# Patient Record
Sex: MALE | Race: WHITE | NOT HISPANIC OR LATINO | Employment: OTHER | URBAN - METROPOLITAN AREA
[De-identification: names, ages, dates, MRNs, and addresses within clinical notes are randomized per-mention and may not be internally consistent; named-entity substitution may affect disease eponyms.]

---

## 2017-01-31 ENCOUNTER — ALLSCRIPTS OFFICE VISIT (OUTPATIENT)
Dept: OTHER | Facility: OTHER | Age: 72
End: 2017-01-31

## 2017-04-04 ENCOUNTER — ALLSCRIPTS OFFICE VISIT (OUTPATIENT)
Dept: OTHER | Facility: OTHER | Age: 72
End: 2017-04-04

## 2017-06-28 ENCOUNTER — ALLSCRIPTS OFFICE VISIT (OUTPATIENT)
Dept: OTHER | Facility: OTHER | Age: 72
End: 2017-06-28

## 2017-09-06 ENCOUNTER — ALLSCRIPTS OFFICE VISIT (OUTPATIENT)
Dept: OTHER | Facility: OTHER | Age: 72
End: 2017-09-06

## 2017-11-15 ENCOUNTER — ALLSCRIPTS OFFICE VISIT (OUTPATIENT)
Dept: OTHER | Facility: OTHER | Age: 72
End: 2017-11-15

## 2017-11-16 NOTE — PROGRESS NOTES
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)   4  Onychomycosis (110 1) (B35 1)    Plan     · *VB - Foot Exam; Status:Complete;   Done: 93YGF1249 03:23PM   · Follow-up visit in 9 weeks Evaluation and Treatment  Follow-up  Status: Hold For -Scheduling  Requested for: 75VVZ0119   · Diabetes Foot Exam Performed; Status:Complete;   Done: 90BXY1624 03:24PM   · Inspect your feet daily ; Status:Complete;   Done: 29ULF0088 03:24PM   · It is important to take good care of your feet if you have diabetes ; Status:Complete;  Done: 58OKA2997 03:24PM   · It is important to take good care of your feet ; Status:Complete;   Done: 99TAS968013:29SU    Discussion/Summary  The patient was counseled regarding instructions for management,-- patient and family education,-- importance of compliance with treatment  Patient is able to Self-Care  The treatment plan was reviewed with the patient/guardian  The patient/guardian understands and agrees with the treatment plan      Chief Complaint  nail care      History of Present Illness  HPI: This patient a morbidly obese 49-year-old white male with diabetes mellitus, complains of pain within his feet  He has pain in the heels with walking  He has pain in in his toes with shoe wear  He has no history of trauma  He has not done any treatment area patient also is pain in his left arch  He is having a gouty attack      Active Problems  1  Acute gouty arthritis (274 01) (M10 9)   2  Atherosclerosis of arteries of extremities (440 20) (I70 209)   3  Callus (700) (L84)   4  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)   5  Diabetes mellitus with neuropathy (250 60,357 2) (E11 40)   6  Diabetic neuropathy (250 60,357 2) (E11 40)   7  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)   8  Onychomycosis (110 1) (B35 1)   9  Pes planus, unspecified laterality (734) (M21 40)   10   Tenderness in limb (729 5) (M79 609)    Social History     · Former smoker (V15 82) (D48 091)   · Never Drank Alcohol    Current Meds   1  Colcrys 0 6 MG Oral Tablet; one tb po tid; Therapy: 40WWJ1733 to (Evaluate:08Nnx1564)  Requested for: 66VZL4407; Last Rx:69Gyk0599 Ordered   2  Colcrys 0 6 MG Oral Tablet; one tb po tid; Therapy: 20BFH6806 to 01 39 27 97 60)  Requested for: 50ACT0487; Last Rx:27Dwo0718 Ordered   3  GlipiZIDE 10 MG Oral Tablet; Therapy: (Kirke Sink) to Recorded   4  Januvia 50 MG Oral Tablet; Therapy: 76BNZ0616 to Recorded   5  Levothyroxine Sodium 75 MCG Oral Tablet; Therapy: 21OEB5503 to Recorded   6  Lisinopril 20 MG Oral Tablet; Therapy: (Kirke Sink) to Recorded   7  MetFORMIN HCl - 500 MG Oral Tablet; Therapy: (Kirke Sink) to Recorded   8  Multi-Vitamin TABS; Therapy: (Kirke Sink) to Recorded   9  Pradaxa 150 MG Oral Capsule; Therapy: (450 39 173) to Recorded   10  VESIcare 10 MG Oral Tablet; Therapy: (Recorded:22Qij0817) to Recorded    The medication list was reviewed and updated today  Allergies    1  No Known Drug Allergies    Physical Exam  Left Foot: Appearance: Normal except as noted: excessive pronation-- and-- pes planus  Right Foot: Appearance: Normal except as noted: excessive pronation-- and-- pes planus  Left Ankle: ROM: limited ROM in all planes Motor: diffuse weakness  Right Ankle: ROM: limited ROM in all planes Motor: diffuse weakness  Neurological Exam: performed  Light touch was absent bilaterally  Vibratory sensation was absent bilaterally  Deep tendon reflexes: achilles reflex One/4 bilaterally  Vascular Exam: performed Dorsalis pedis pulses were One/4 bilaterally  Posterior tibial pulses were One/4 bilaterally  Elevation Pallor: present bilaterally  Dependence rubor was absent bilaterally  Capillary refill time was Q  9, findings bilateral  Negative digital hair noted, but-- between 1-3 seconds bilaterally  Edema: mild bilaterally-- and-- 2/7 pitting edema   Negative Homans sign, bilateral    Toenails: All of the toenails were elongated,-- hypertrophied,-- discolored,-- shown to have subungual debris,-- tender-- and-- Mycotic with onychauxis  Socks and shoes removed, Right Foot Findings: swollen and erythematous, but no dryness  Diminished tactile sensation with monofilament testing throughout the right foot  Socks and shoes removed, Left Foot Findings: swollen, erythematous and dry  Diminished tactile sensation with monofilament testing throughout the left foot  Capillary refills findings on the right were delayed in the toes  Pulses:  1+ in the posterior tibialis on the right  1+ in the dorsalis pedis on the right  Capillary refills findings on the left were delayed in the toes  Pulses:  1+ in the posterior tibialis on the left  1+ in the dorsalis pedis on the left  Hyperkeratosis: present on both first toes-- and-- 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  There is eczema in his heels  There are fissures  There is no cellulitis  Shoe Gear Evaluation: performed ()  Procedure     Procedure: toenail debridement performed on all toenails   Indications for the procedure include professional performance of nail care required due to loss of sensation from diabetes  The procedure's risks were discussed with the patient  Procedure Note: The toenails were debrided using heavy-duty nail nippers-- and-- with a nail   Post-Procedure: the patient tolerated the procedure well  Complications: there were no complications  Bilateral plantar eczema was debrided down to normal levels  All mycotic nails debrided   Patient tolerated this well without complication her pain      Signatures   Electronically signed by : Jean Rose DPM; Nov 15 2017  3:24PM EST                       (Author)

## 2017-12-15 ENCOUNTER — GENERIC CONVERSION - ENCOUNTER (OUTPATIENT)
Dept: OTHER | Facility: OTHER | Age: 72
End: 2017-12-15

## 2017-12-18 ENCOUNTER — LAB CONVERSION - ENCOUNTER (OUTPATIENT)
Dept: OTHER | Facility: OTHER | Age: 72
End: 2017-12-18

## 2018-01-12 ENCOUNTER — ALLSCRIPTS OFFICE VISIT (OUTPATIENT)
Dept: OTHER | Facility: OTHER | Age: 73
End: 2018-01-12

## 2018-01-12 VITALS
WEIGHT: 288 LBS | HEART RATE: 96 BPM | DIASTOLIC BLOOD PRESSURE: 82 MMHG | BODY MASS INDEX: 41.23 KG/M2 | RESPIRATION RATE: 17 BRPM | SYSTOLIC BLOOD PRESSURE: 105 MMHG | HEIGHT: 70 IN

## 2018-01-12 VITALS
HEIGHT: 70 IN | WEIGHT: 288 LBS | HEART RATE: 88 BPM | RESPIRATION RATE: 18 BRPM | SYSTOLIC BLOOD PRESSURE: 130 MMHG | BODY MASS INDEX: 41.23 KG/M2 | DIASTOLIC BLOOD PRESSURE: 82 MMHG

## 2018-01-13 NOTE — PROGRESS NOTES
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)   3  Hallux rigidus of right foot (735 2) (M20 21)    Plan    · *VB - Foot Exam; Status:Complete;   Done: 86UNP8357 01:56PM   · Clindamycin HCl - 300 MG Oral Capsule; TAKE 1 CAPSULE 3 times daily   · Follow-up visit in 1 week Evaluation and Treatment  Follow-up  Status: Hold For -    Scheduling  Requested for: 82JBQ1904   · Diabetes Foot Exam Performed; Status:Complete;   Done: 06QEE4281 01:56PM   · Inspect your feet daily ; Status:Complete;   Done: 97PWP5277 01:56PM    Discussion/Summary      Patient will apply gentamicin cream and a dry sterile dressing daily  Patient to call if any increasing signs of infection  Discussed risk of bone infection  Reviewed x-ray reviewed with patient patient sent for rheumatology evaluation  The patient was counseled regarding instructions for management,-- patient and family education,-- importance of compliance with treatment  Patient is able to Self-Care  Possible side effects of new medications were reviewed with the patient/guardian today  The treatment plan was reviewed with the patient/guardian  The patient/guardian understands and agrees with the treatment plan      Chief Complaint   Patient has recurrent infection right hallux  Has been red and swollen for the past 3 days  Patient had the same infection about a month ago was put on antibiotics and given an antibiotic cream says it had resolved but came back  Patient has not had any fever or chills  Patient has had several gout attacks over the past 10 years  Patient does have significant arthritis in the hands does have contraction of hands and several nodules in hands  History of Present Illness   HPI: This patient a morbidly obese 45-year-old white male with diabetes mellitus,    Has never seen a rheumatologist      Active Problems   1  Acute gouty arthritis (274 01) (M10 9)   2  Atherosclerosis of arteries of extremities (440 20) (I70 209)   3  Callus (700) (L84)   4  Cellulitis of toe of right foot (681 10) (L03 031)   5  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)   6  Diabetes mellitus with neuropathy (250 60,357 2) (E11 40)   7  Diabetic neuropathy (250 60,357 2) (E11 40)   8  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)   9  Onychomycosis (110 1) (B35 1)   10  Pes planus, unspecified laterality (734) (M21 40)   11  Tenderness in limb (729 5) (M79 609)    Social History    · Former smoker (L84 91) (S05 213)   · Never Drank Alcohol    Current Meds    1  Colcrys 0 6 MG Oral Tablet; one tb po tid; Therapy: 96WCS2317 to (Evaluate:44Fvs3798)  Requested for: 25TKA9395; Last     Rx:56Zcd5962 Ordered   2  Colcrys 0 6 MG Oral Tablet; one tb po tid; Therapy: 63ZDT2287 to 01 39 27 97 60)  Requested for: 44CJZ6371; Last     Rx:20Dpk9970 Ordered   3  Gentamicin Sulfate 0 1 % External Cream; Applied to affected area twice daily with     bandage; Therapy: 34OSC5399 to (Evaluate:04Jan2018)  Requested for: 90YPR9967; Last     Rx:33Dsc4819 Ordered   4  GlipiZIDE 10 MG Oral Tablet; Therapy: (Saranlee Carry) to Recorded   5  Januvia 50 MG Oral Tablet; Therapy: 01DEJ4803 to Recorded   6  Levothyroxine Sodium 75 MCG Oral Tablet; Therapy: 11DEJ0466 to Recorded   7  Lisinopril 20 MG Oral Tablet; Therapy: (Sharlee Carry) to Recorded   8  MetFORMIN HCl - 500 MG Oral Tablet; Therapy: (Sharlee Carry) to Recorded   9  Multi-Vitamin TABS; Therapy: (Sharlee Carry) to Recorded   10  Pradaxa 150 MG Oral Capsule; Therapy: [phone]) to Recorded   11  VESIcare 10 MG Oral Tablet; Therapy: (Recorded:34Ohi4060) to Recorded     The medication list was reviewed and updated today  Allergies   1   No Known Drug Allergies    Vitals    Recorded: 78CJG9831 01:38PM   Heart Rate 63   Respiration 24   Systolic 274   Diastolic 73     Physical Exam   Left Foot: Appearance: Normal except as noted: excessive pronation-- and-- pes planus  Right Foot: Appearance: Normal except as noted: excessive pronation-- and-- pes planus  There is redness and swelling around the base of the nail of the right hallux  There is no abscess the nail is not loose there is no ascending cellulitis  There is no redness or signs of infection around the 1st MPJ  There is limited motion and mild pain on end range of motion but no open wound or signs of infection no abscess no fluctuance  Left Ankle: ROM: limited ROM in all planes Motor: diffuse weakness  Right Ankle: ROM: limited ROM in all planes Motor: diffuse weakness  Neurological Exam: performed  Light touch was absent bilaterally  Vibratory sensation was absent bilaterally  Deep tendon reflexes: achilles reflex One/4 bilaterally  Vascular Exam: performed Dorsalis pedis pulses were One/4 bilaterally  Posterior tibial pulses were One/4 bilaterally  Elevation Pallor: present bilaterally  Dependence rubor was absent bilaterally  Capillary refill time was Q  9, findings bilateral  Negative digital hair noted, but-- between 1-3 seconds bilaterally  Edema: mild bilaterally-- and-- 2/7 pitting edema  Negative Homans sign, bilateral     Toenails: All of the toenails were elongated,-- hypertrophied,-- discolored,-- shown to have subungual debris,-- tender-- and-- Mycotic with onychauxis  Socks and shoes removed, Right Foot Findings: swollen and erythematous, but no dryness  Diminished tactile sensation with monofilament testing throughout the right foot  Socks and shoes removed, Left Foot Findings: swollen, erythematous and dry  Diminished tactile sensation with monofilament testing throughout the left foot  Capillary refills findings on the right were delayed in the toes  Pulses:      1+ in the posterior tibialis on the right      1+ in the dorsalis pedis on the right  Capillary refills findings on the left were delayed in the toes  Pulses:      1+ in the posterior tibialis on the left      1+ in the dorsalis pedis on the left  Hyperkeratosis: present on both first toes-- and-- 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  There is eczema in his heels  There are fissures  There is no cellulitis  Shoe Gear Evaluation: performed ()  Results/Data        Views: AP, and Lateral of the right foot  Findings: no fracture  I personally reviewed the films/images/results in the office today  My interpretation follows  X-ray Review X-ray DP medial oblique right foot  There is no sign of bone infection in the area of the infection around the nail but there is significant degeneration of the 1st MPJ  Possible osteoarthritis, possible gouty arthritis  Significant erosion joint  Attending Note   Attending Note [facility]: Patient's History: Urgent visit  Patient is concerned with discoloration of right big toe  Diagnosis and Plan: Proximal nail fold cellulitis noted  There is mild amount of serous exudate  Negative nail lysis noted  Negative occult pus  Culture and sensitivity done  Patient be started on oral and topical antibiotic  Future Appointments      Date/Time Provider Specialty Site   01/24/2018 02:45 PM Harjit Carrillo DPM Podiatry [address] [facility]     Signatures    Electronically signed by :  Hillary Mc DPM; Jan 12 2018  1:57PM EST                       (Author)

## 2018-01-14 VITALS — RESPIRATION RATE: 18 BRPM | HEIGHT: 70 IN | WEIGHT: 288 LBS | BODY MASS INDEX: 41.23 KG/M2

## 2018-01-14 VITALS
WEIGHT: 288 LBS | HEIGHT: 70 IN | DIASTOLIC BLOOD PRESSURE: 84 MMHG | RESPIRATION RATE: 18 BRPM | BODY MASS INDEX: 41.23 KG/M2 | HEART RATE: 92 BPM | SYSTOLIC BLOOD PRESSURE: 143 MMHG

## 2018-01-22 VITALS — RESPIRATION RATE: 24 BRPM | HEART RATE: 63 BPM | DIASTOLIC BLOOD PRESSURE: 73 MMHG | SYSTOLIC BLOOD PRESSURE: 125 MMHG

## 2018-01-24 ENCOUNTER — OFFICE VISIT (OUTPATIENT)
Dept: PODIATRY | Facility: CLINIC | Age: 73
End: 2018-01-24
Payer: COMMERCIAL

## 2018-01-24 VITALS
DIASTOLIC BLOOD PRESSURE: 82 MMHG | WEIGHT: 288 LBS | HEIGHT: 70 IN | RESPIRATION RATE: 17 BRPM | BODY MASS INDEX: 41.23 KG/M2 | HEART RATE: 88 BPM | SYSTOLIC BLOOD PRESSURE: 130 MMHG

## 2018-01-24 VITALS
RESPIRATION RATE: 19 BRPM | BODY MASS INDEX: 44.38 KG/M2 | DIASTOLIC BLOOD PRESSURE: 70 MMHG | SYSTOLIC BLOOD PRESSURE: 110 MMHG | WEIGHT: 310 LBS | HEIGHT: 70 IN

## 2018-01-24 DIAGNOSIS — B36.9 DERMATOMYCOSIS: ICD-10-CM

## 2018-01-24 DIAGNOSIS — M79.671 PAIN IN BOTH FEET: ICD-10-CM

## 2018-01-24 DIAGNOSIS — I70.209 ATHEROSCLEROSIS OF ARTERIES OF EXTREMITIES (HCC): ICD-10-CM

## 2018-01-24 DIAGNOSIS — S91.109A OPEN WOUND OF TOE, INITIAL ENCOUNTER: Primary | ICD-10-CM

## 2018-01-24 DIAGNOSIS — M19.071 OSTEOARTHRITIS OF RIGHT FOOT, UNSPECIFIED OSTEOARTHRITIS TYPE: ICD-10-CM

## 2018-01-24 DIAGNOSIS — M79.672 PAIN IN BOTH FEET: ICD-10-CM

## 2018-01-24 PROCEDURE — 99213 OFFICE O/P EST LOW 20 MIN: CPT | Performed by: PODIATRIST

## 2018-01-24 PROCEDURE — 11000 DBRDMT ECZ/INFECTED SKIN<10%: CPT | Performed by: PODIATRIST

## 2018-01-24 RX ORDER — LEVOTHYROXINE SODIUM 0.07 MG/1
TABLET ORAL
COMMUNITY
Start: 2014-05-03

## 2018-01-24 RX ORDER — LISINOPRIL 20 MG/1
TABLET ORAL
COMMUNITY

## 2018-01-24 RX ORDER — MULTIVITAMIN
TABLET ORAL
COMMUNITY

## 2018-01-24 RX ORDER — GENTAMICIN SULFATE 1 MG/G
CREAM TOPICAL
COMMUNITY
Start: 2017-12-15

## 2018-01-24 RX ORDER — SOLIFENACIN SUCCINATE 10 MG/1
TABLET, FILM COATED ORAL
COMMUNITY

## 2018-01-24 RX ORDER — COLCHICINE 0.6 MG/1
TABLET ORAL
COMMUNITY
Start: 2014-12-11

## 2018-01-24 RX ORDER — GLIPIZIDE 10 MG/1
TABLET ORAL
COMMUNITY

## 2018-01-24 RX ORDER — CLINDAMYCIN HYDROCHLORIDE 300 MG/1
1 CAPSULE ORAL 3 TIMES DAILY
COMMUNITY
Start: 2018-01-12

## 2018-01-24 RX ORDER — METFORMIN HYDROCHLORIDE 500 MG/1
TABLET, EXTENDED RELEASE ORAL
COMMUNITY
Start: 2018-01-13

## 2018-01-24 NOTE — PROGRESS NOTES
Assessment/Plan:      There are no diagnoses linked to this encounter  Subjective:     Patient ID: Osman Arredondo is a 67 y o  male  HPI    Review of Systems   Constitutional: Negative  HENT: Negative  Eyes: Negative  Respiratory: Negative  Cardiovascular: Negative  Gastrointestinal: Negative  Endocrine: Negative  Genitourinary: Negative  Musculoskeletal: Negative  Skin: Negative  Allergic/Immunologic: Negative  Neurological: Negative  Hematological: Negative  Psychiatric/Behavioral: Negative  Objective:     Physical Exam   Cardiovascular:   Pulses:       Dorsalis pedis pulses are 1+ on the right side, and 1+ on the left side  Posterior tibial pulses are 1+ on the right side, and 1+ on the left side  Assessment  1  Callus (700) (L84)   2  Diabetes mellitus with neuropathy (250 60,357 2) (E11 40)    Plan   · Follow-up visit in 9 weeks Evaluation and Treatment  Follow-up  Status: Hold For -  Scheduling  Requested for: 88Awg1862   · Diabetes Foot Exam Performed; Status:Complete;   Done: 27CLI1311 03:44PM   · Inspect your feet daily ; Status:Complete;   Done: 12TCI7058 03:44PM   · It is important to take good care of your feet if you have diabetes ; Status:Complete;    Done: 15VTA0311 03:44PM   · There are many things you can do at home to ensure good foot health ; Status:Complete;    Done: 15HAW8297 03:44PM   · Wear shoes that give your toes plenty of room ; Status:Complete;   Done: 10RWT2819  03:44PM   · *VB - Foot Exam; Status:Complete;   Done: 97ZQT3711 03:31PM    Discussion/Summary    Agent has increasing symptoms of neuropathy  We will add vitamin B complex  The patient was counseled regarding instructions for management,-patient and family education,-importance of compliance with treatment  Patient is able to Self-Care  The treatment plan was reviewed with the patient/guardian   The patient/guardian understands and agrees with the treatment plan      Chief Complaint  nail care      History of Present Illness  HPI: This patient a morbidly obese 51-year-old white male with diabetes mellitus, complains of pain within his feet  He has pain in the heels with walking  He has pain in in his toes with shoe wear  He has no history of trauma  He has not done any treatment area patient also is pain in his left arch  He is having a gouty attack      Active Problems  1  Acute gouty arthritis (274 01) (M10 9)   2  Atherosclerosis of arteries of extremities (440 20) (I70 209)   3  Callus (700) (L84)   4  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)   5  Diabetes mellitus with neuropathy (250 60,357 2) (E11 40)   6  Diabetic neuropathy (250 60,357 2) (E11 40)   7  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)   8  Onychomycosis (110 1) (B35 1)   9  Pes planus, unspecified laterality (734) (M21 40)   10  Tenderness in limb (729 5) (M79 609)    Social History   · Former smoker (Q89 67) (B02 971)   · Never Drank Alcohol    Current Meds   1  Colcrys 0 6 MG Oral Tablet; one tb po tid; Therapy: 27TNP8128 to (Evaluate:97Yeh1964)  Requested for: 55YRX4959; Last   Rx:16Uxi6635 Ordered   2  Colcrys 0 6 MG Oral Tablet; one tb po tid; Therapy: 06DYN0097 to 01 39 27 97 60)  Requested for: 64QHA1616; Last   Rx:03Par1532 Ordered   3  GlipiZIDE 10 MG Oral Tablet; Therapy: (431 51 143) to Recorded   4  Januvia 50 MG Oral Tablet; Therapy: 19QDF4550 to Recorded   5  Levothyroxine Sodium 75 MCG Oral Tablet; Therapy: 57CEM1758 to Recorded   6  Lisinopril 20 MG Oral Tablet; Therapy: (431 51 143) to Recorded   7  MetFORMIN HCl - 500 MG Oral Tablet; Therapy: (431 51 143) to Recorded   8  Multi-Vitamin TABS; Therapy: (431 51 143) to Recorded   9  Pradaxa 150 MG Oral Capsule; Therapy: [phone]) to Recorded   10  VESIcare 10 MG Oral Tablet; Therapy: (Recorded:89Guj2025) to Recorded    The medication list was reviewed and updated today  Allergies  1  No Known Drug Allergies    Vitals   Recorded: 36HKM2532 03:09PM   Heart Rate 88   Respiration 18   Systolic 181   Diastolic 82   Height 5 ft 10 in   Weight 288 lb    BMI Calculated 41 32   BSA Calculated 2 44     Physical Exam  Left Foot: Appearance: Normal except as noted: excessive pronation-and-pes planus  Right Foot: Appearance: Normal except as noted: excessive pronation-and-pes planus  Left Ankle: ROM: limited ROM in all planes Motor: diffuse weakness  Right Ankle: ROM: limited ROM in all planes Motor: diffuse weakness  Neurological Exam: performed  Light touch was absent bilaterally  Vibratory sensation was absent bilaterally  Deep tendon reflexes: achilles reflex One/4 bilaterally  Vascular Exam: performed Dorsalis pedis pulses were One/4 bilaterally  Posterior tibial pulses were One/4 bilaterally  Elevation Pallor: present bilaterally  Dependence rubor was absent bilaterally  Capillary refill time was Q  9, findings bilateral  Negative digital hair noted, but-between 1-3 seconds bilaterally  Edema: mild bilaterally-and-2/7 pitting edema  Negative Homans sign, bilateral    Toenails: All of the toenails were elongated,-hypertrophied,-discolored,-shown to have subungual debris,-tender-and-Mycotic with onychauxis  Socks and shoes removed, Right Foot Findings: swollen and erythematous, but no dryness  Diminished tactile sensation with monofilament testing throughout the right foot  Socks and shoes removed, Left Foot Findings: swollen, erythematous and dry  Diminished tactile sensation with monofilament testing throughout the left foot  Capillary refills findings on the right were delayed in the toes  Pulses:   1+ in the posterior tibialis on the right   1+ in the dorsalis pedis on the right  Capillary refills findings on the left were delayed in the toes  Pulses:   1+ in the posterior tibialis on the left   1+ in the dorsalis pedis on the left     Hyperkeratosis: present on both first toes-and-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  There is eczema in his heels  There are fissures  There is no cellulitis  Shoe Gear Evaluation: performed ()  Procedure      Procedure: toenail debridement performed on all toenails   Indications for the procedure include professional performance of nail care required due to loss of sensation from diabetes  The procedure's risks were discussed with the patient  Procedure Note: The toenails were debrided using heavy-duty nail nippers-and-with a nail   Post-Procedure: the patient tolerated the procedure well  Complications: there were no complications  Bilateral plantar eczema was debrided down to normal levels  All mycotic nails debrided  Patient tolerated this well without complication her pain      Signatures   Electronically signed by : Suha Moss DPM; Sep  6 2017  3:46PM EST                       (Author)    Patient's shoes and socks removed  Right Foot/Ankle   Right Foot Inspection    Toe Exam: swelling  Sensory   Vibration: absent  Proprioception: absent   Monofilament testing: absent  Vascular    The right DP pulse is 1+  The right PT pulse is 1+  Left Foot/Ankle  Left Foot Inspection                           Toe Exam: swelling                   Sensory   Vibration: absent  Proprioception: absent    Vascular    The left DP pulse is 1+  The left PT pulse is 1+  Assign Risk Category:  Deformity present;  No loss of protective sensation;        Risk: 1

## 2018-03-28 ENCOUNTER — OFFICE VISIT (OUTPATIENT)
Dept: PODIATRY | Facility: CLINIC | Age: 73
End: 2018-03-28
Payer: COMMERCIAL

## 2018-03-28 VITALS
DIASTOLIC BLOOD PRESSURE: 70 MMHG | HEIGHT: 70 IN | RESPIRATION RATE: 19 BRPM | BODY MASS INDEX: 44.38 KG/M2 | WEIGHT: 310 LBS | HEART RATE: 80 BPM | SYSTOLIC BLOOD PRESSURE: 110 MMHG

## 2018-03-28 DIAGNOSIS — I70.209 ATHEROSCLEROSIS OF ARTERIES OF EXTREMITIES (HCC): Primary | ICD-10-CM

## 2018-03-28 DIAGNOSIS — M21.969 ACQUIRED DEFORMITY OF FOOT, UNSPECIFIED LATERALITY: ICD-10-CM

## 2018-03-28 DIAGNOSIS — B35.1 ONYCHOMYCOSIS: ICD-10-CM

## 2018-03-28 DIAGNOSIS — B35.9 DERMATOPHYTOSIS: ICD-10-CM

## 2018-03-28 DIAGNOSIS — E11.42 DIABETIC POLYNEUROPATHY ASSOCIATED WITH TYPE 2 DIABETES MELLITUS (HCC): ICD-10-CM

## 2018-03-28 PROCEDURE — 99213 OFFICE O/P EST LOW 20 MIN: CPT | Performed by: PODIATRIST

## 2018-03-28 NOTE — PROGRESS NOTES
Assessment/Plan:  Pain upon ambulation  Edema  Mycosis of nail and skin  Plan  Patient educated on condition  All abnormal tissue debrided  Nails debrided  Patient will use OTC topical antifungal      No problem-specific Assessment & Plan notes found for this encounter  Review of Systems   Constitutional: Negative  HENT: Negative  Eyes: Negative  Respiratory: Negative  Cardiovascular: Negative  Gastrointestinal: Negative  Endocrine: Negative  Genitourinary: Negative  Musculoskeletal: Negative  Skin: Negative  Allergic/Immunologic: Negative  Neurological: Negative  Hematological: Negative  Psychiatric/Behavioral: Negative            Objective:      Physical Exam   Cardiovascular:   Pulses:       Dorsalis pedis pulses are 1+ on the right side, and 1+ on the left side  Posterior tibial pulses are 1+ on the right side, and 1+ on the left side  Assessment  1  Callus (700) (L84)   2  Diabetes mellitus with neuropathy (250 60,357 2) (E11 40)     Plan   · Follow-up visit in 9 weeks Evaluation and Treatment  Follow-up  Status: Hold For -  Scheduling  Requested for: 40Wjp1592   · Diabetes Foot Exam Performed; Status:Complete;   Done: 24KYU4775 03:44PM   · Inspect your feet daily ; Status:Complete;   Done: 47EUW8816 03:44PM   · It is important to take good care of your feet if you have diabetes ; Status:Complete;    Done: 46XMU5061 03:44PM   · There are many things you can do at home to ensure good foot health ; Status:Complete;    Done: 40KSX2821 03:44PM   · Wear shoes that give your toes plenty of room ; Status:Complete;   Done: 75JHN4325  03:44PM   · *VB - Foot Exam; Status:Complete;   Done: 89CAV4157 03:31PM     Discussion/Summary     Agent has increasing symptoms of neuropathy  We will add vitamin B complex  The patient was counseled regarding instructions for management,-patient and family education,-importance of compliance with treatment     Patient is able to Self-Care  The treatment plan was reviewed with the patient/guardian  The patient/guardian understands and agrees with the treatment plan      Chief Complaint  nail care      History of Present Illness  HPI: This patient a morbidly obese 24-year-old white male with diabetes mellitus, complains of pain within his feet  He has pain in the heels with walking  He has pain in in his toes with shoe wear  He has no history of trauma  He has not done any treatment area patient also is pain in his left arch  He is having a gouty attack      Active Problems  1  Acute gouty arthritis (274 01) (M10 9)   2  Atherosclerosis of arteries of extremities (440 20) (I70 209)   3  Callus (700) (L84)   4  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)   5  Diabetes mellitus with neuropathy (250 60,357 2) (E11 40)   6  Diabetic neuropathy (250 60,357 2) (E11 40)   7  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)   8  Onychomycosis (110 1) (B35 1)   9  Pes planus, unspecified laterality (734) (M21 40)   10  Tenderness in limb (729 5) (M79 609)     Social History   · Former smoker (O56 10) (L66 366)   · Never Drank Alcohol     Current Meds   1  Colcrys 0 6 MG Oral Tablet; one tb po tid; Therapy: 77QRD9941 to (Evaluate:73Zof3361)  Requested for: 85ZZC2888; Last   Rx:05Gxo8339 Ordered   2  Colcrys 0 6 MG Oral Tablet; one tb po tid; Therapy: 45JPL0220 to 01 [phone] 60)  Requested for: 10ACI7940; Last   Rx:79Qld7261 Ordered   3  GlipiZIDE 10 MG Oral Tablet; Therapy: (Dominik Pelaez) to Recorded   4  Januvia 50 MG Oral Tablet; Therapy: 62YNG2267 to Recorded   5  Levothyroxine Sodium 75 MCG Oral Tablet; Therapy: 63PUR7952 to Recorded   6  Lisinopril 20 MG Oral Tablet; Therapy: (Dominik Pelaez) to Recorded   7  MetFORMIN HCl - 500 MG Oral Tablet; Therapy: (Dominik Pelaez) to Recorded   8  Multi-Vitamin TABS; Therapy: (Dominik Pelaez) to Recorded   9  Pradaxa 150 MG Oral Capsule; Therapy: ([phone]) to Recorded   10   VESIcare 10 MG Oral Tablet; Therapy: (Recorded:04Qyt6057) to Recorded     The medication list was reviewed and updated today  Allergies  1  No Known Drug Allergies     Vitals    Recorded: 17Dfu5160 03:09PM   Heart Rate 88   Respiration 18   Systolic 700   Diastolic 82   Height 5 ft 10 in   Weight 288 lb    BMI Calculated 41 32   BSA Calculated 2 44      Physical Exam  Left Foot: Appearance: Normal except as noted: excessive pronation-and-pes planus  Right Foot: Appearance: Normal except as noted: excessive pronation-and-pes planus  Left Ankle: ROM: limited ROM in all planes Motor: diffuse weakness  Right Ankle: ROM: limited ROM in all planes Motor: diffuse weakness  Neurological Exam: performed  Light touch was absent bilaterally  Vibratory sensation was absent bilaterally  Deep tendon reflexes: achilles reflex One/4 bilaterally  Vascular Exam: performed Dorsalis pedis pulses were One/4 bilaterally  Posterior tibial pulses were One/4 bilaterally  Elevation Pallor: present bilaterally  Dependence rubor was absent bilaterally  Capillary refill time was Q  9, findings bilateral  Negative digital hair noted, but-between 1-3 seconds bilaterally  Edema: mild bilaterally-and-2/7 pitting edema  Negative Homans sign, bilateral    Toenails: All of the toenails were elongated,-hypertrophied,-discolored,-shown to have subungual debris,-tender-and-Mycotic with onychauxis       Socks and shoes removed, Right Foot Findings: swollen and erythematous, but no dryness  Diminished tactile sensation with monofilament testing throughout the right foot  Socks and shoes removed, Left Foot Findings: swollen, erythematous and dry  Diminished tactile sensation with monofilament testing throughout the left foot  Capillary refills findings on the right were delayed in the toes  Pulses:   1+ in the posterior tibialis on the right   1+ in the dorsalis pedis on the right  Capillary refills findings on the left were delayed in the toes  Pulses:   1+ in the posterior tibialis on the left   1+ in the dorsalis pedis on the left  Hyperkeratosis: present on both first toes-and-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  There is eczema in his heels  There are fissures  There is no cellulitis  Shoe Gear Evaluation: performed ()  Procedure        Procedure: toenail debridement performed on all toenails   Indications for the procedure include professional performance of nail care required due to loss of sensation from diabetes  The procedure's risks were discussed with the patient  Procedure Note: The toenails were debrided using heavy-duty nail nippers-and-with a nail   Post-Procedure: the patient tolerated the procedure well  Complications: there were no complications  Bilateral plantar eczema was debrided down to normal levels  All mycotic nails debrided  Patient tolerated this well without complication her pain      Signatures   Electronically signed by : Mini Booth DPM; Sep  6 2017  3:46PM EST                       (Author)     Patient's shoes and socks removed  Right Foot/Ankle   Right Foot Inspection     Toe Exam: swelling  Sensory   Vibration: absent  Proprioception: absent   Monofilament testing: absent  Vascular     The right DP pulse is 1+  The right PT pulse is 1+       Left Foot/Ankle  Left Foot Inspection                             Toe Exam: swelling                   Sensory   Vibration: absent  Proprioception: absent     Vascular     The left DP pulse is 1+  The left PT pulse is 1+  Assign Risk Category:  Deformity present; No loss of protective sensation;        Risk: 1       There are no diagnoses linked to this encounter  Subjective:      Patient ID: Dalton Latham is a 67 y o  male      HPI    The following portions of the patient's history were reviewed and updated as appropriate: allergies, current medications, past family history, past medical history, past social history, past surgical history and problem list     Review of Systems      Objective:      Foot ExamPhysical Exam

## 2018-06-06 ENCOUNTER — OFFICE VISIT (OUTPATIENT)
Dept: PODIATRY | Facility: CLINIC | Age: 73
End: 2018-06-06
Payer: COMMERCIAL

## 2018-06-06 VITALS — WEIGHT: 310 LBS | RESPIRATION RATE: 17 BRPM | HEIGHT: 70 IN | HEART RATE: 86 BPM | BODY MASS INDEX: 44.38 KG/M2

## 2018-06-06 DIAGNOSIS — I70.209 ATHEROSCLEROSIS OF ARTERIES OF EXTREMITIES (HCC): ICD-10-CM

## 2018-06-06 DIAGNOSIS — M21.969 ACQUIRED DEFORMITY OF FOOT, UNSPECIFIED LATERALITY: ICD-10-CM

## 2018-06-06 DIAGNOSIS — B35.9 DERMATOPHYTOSIS: ICD-10-CM

## 2018-06-06 DIAGNOSIS — B35.1 ONYCHOMYCOSIS: ICD-10-CM

## 2018-06-06 DIAGNOSIS — E11.42 DIABETIC POLYNEUROPATHY ASSOCIATED WITH TYPE 2 DIABETES MELLITUS (HCC): Primary | ICD-10-CM

## 2018-06-06 PROCEDURE — 99213 OFFICE O/P EST LOW 20 MIN: CPT | Performed by: PODIATRIST

## 2018-06-06 RX ORDER — DOXYCYCLINE HYCLATE 100 MG/1
CAPSULE ORAL
COMMUNITY
Start: 2018-05-15

## 2018-06-06 RX ORDER — FEBUXOSTAT 80 MG/1
TABLET ORAL
COMMUNITY
Start: 2018-05-16

## 2018-06-06 RX ORDER — FEBUXOSTAT 40 MG/1
TABLET ORAL
COMMUNITY
Start: 2018-05-14

## 2018-06-06 NOTE — PROGRESS NOTES
Procedures   Foot Exam     Assessment/Plan:  Pain upon ambulation  Edema  Mycosis of nail and skin      Plan  Patient educated on condition  All abnormal tissue debrided  Nails debrided  Patient will use OTC topical antifungal        No problem-specific Assessment & Plan notes found for this encounter  Review of Systems   Constitutional: Negative     HENT: Negative     Eyes: Negative     Respiratory: Negative     Cardiovascular: Negative     Gastrointestinal: Negative     Endocrine: Negative     Genitourinary: Negative     Musculoskeletal: Negative     Skin: Negative     Allergic/Immunologic: Negative     Neurological: Negative     Hematological: Negative     Psychiatric/Behavioral: Negative           Objective:      Physical Exam   Cardiovascular:   Pulses:       Dorsalis pedis pulses are 1+ on the right side, and 1+ on the left side         Posterior tibial pulses are 1+ on the right side, and 1+ on the left side      Assessment  1  Callus (700) (L84)   2  Diabetes mellitus with neuropathy (250 60,357 2) (E11 40)     Plan   · Follow-up visit in 9 weeks Evaluation and Treatment  Follow-up  Status: Hold For -  Scheduling  Requested for: 55ONQ2649   · Diabetes Foot Exam Performed; Status:Complete;   Done: 31FTO3836 03:44PM   · Inspect your feet daily ; Status:Complete;   Done: 74ESQ7659 03:44PM   · It is important to take good care of your feet if you have diabetes ; Status:Complete;    Done: 63JUT7783 03:44PM   · There are many things you can do at home to ensure good foot health ; Status:Complete;    Done: 88WOC5569 03:44PM   · Wear shoes that give your toes plenty of room ; Status:Complete;   Done: 37AUU3364  03:44PM   · *VB - Foot Exam; Status:Complete; Lamont Finch: 73CRN7861 03:31PM     Discussion/Summary     Agent has increasing symptoms of neuropathy  We will add vitamin B complex     The patient was counseled regarding instructions for management,-patient and family education,-importance of compliance with treatment  Patient is able to Self-Care  The treatment plan was reviewed with the patient/guardian  The patient/guardian understands and agrees with the treatment plan      Chief Complaint  nail care      History of Present Illness  HPI: This patient a morbidly obese 35-year-old white male with diabetes mellitus, complains of pain within his feet  He has pain in the heels with walking  He has pain in in his toes with shoe wear  He has no history of trauma  He has not done any treatment area patient also is pain in his left arch  He is having a gouty attack      Active Problems  1  Acute gouty arthritis (274 01) (M10 9)   2  Atherosclerosis of arteries of extremities (440 20) (I70 209)   3  Callus (700) (L84)   4  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)   5  Diabetes mellitus with neuropathy (250 60,357 2) (E11 40)   6  Diabetic neuropathy (250 60,357 2) (E11 40)   7  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)   8  Onychomycosis (110 1) (B35 1)   9  Pes planus, unspecified laterality (734) (M21 40)   10  Tenderness in limb (729 5) (M79 609)     Social History   · Former smoker (H06 30) (Z87 891)   · Never Drank Alcohol     Current Meds   1  Colcrys 0 6 MG Oral Tablet; one tb po tid;   Therapy: 62XFQ9895 to (Evaluate:94Jpm0678)  Requested for: 16QTX0170; Last   Rx:24Cek6090 Ordered   2  Colcrys 0 6 MG Oral Tablet; one tb po tid;  Karla Harris: 50DQC1333 to (Ltanya Innocent)  Requested for: 04Kew8338;  Last   Rx:28Jbn2884 Ordered   3  GlipiZIDE 10 MG Oral Tablet;   Therapy: (SPUJSOKF:29JSK4532) to Recorded   4  Januvia 50 MG Oral Tablet;   Therapy: 35PIB2408 to Recorded   5  Levothyroxine Sodium 75 MCG Oral Tablet;   Therapy: 12QCT1274 to Recorded   6  Lisinopril 20 MG Oral Tablet;   Therapy: (Bran Merlos) to Recorded   7  MetFORMIN HCl - 500 MG Oral Tablet;   Therapy: (ZJONDNET:50AXN7316) to Recorded   8  Multi-Vitamin TABS;   Therapy: (Recorded:12Wlj3783) to Recorded   9  Pradaxa 150 MG Oral Capsule;   Therapy: (KQYVTBXT:27FLD5167) to Recorded   10  VESIcare 10 MG Oral Tablet;    Therapy: (Recorded:34Psw7311) to Recorded     The medication list was reviewed and updated today       Allergies  1  No Known Drug Allergies     Vitals       Heart Rate 88   Respiration 18   Systolic 445   Diastolic 82   Height 5 ft 10 in   Weight 288 lb    BMI Calculated 41 32   BSA Calculated 2 44      Physical Exam  Left Foot: Appearance: Normal except as noted: excessive pronation-and-pes planus  Right Foot: Appearance: Normal except as noted: excessive pronation-and-pes planus  Left Ankle: ROM: limited ROM in all planes Motor: diffuse weakness  Right Ankle: ROM: limited ROM in all planes Motor: diffuse weakness  Neurological Exam: performed  Light touch was absent bilaterally  Vibratory sensation was absent bilaterally  Deep tendon reflexes: achilles reflex One/4 bilaterally  Vascular Exam: performed Dorsalis pedis pulses were One/4 bilaterally  Posterior tibial pulses were One/4 bilaterally  Elevation Pallor: present bilaterally  Dependence rubor was absent bilaterally  Capillary refill time was Q  9, findings bilateral  Negative digital hair noted, but-between 1-3 seconds bilaterally  Edema: mild bilaterally-and-2/7 pitting edema  Negative Homans sign, bilateral    Toenails: All of the toenails were elongated,-hypertrophied,-discolored,-shown to have subungual debris,-tender-and-Mycotic with onychauxis       Socks and shoes removed, Right Foot Findings: swollen and erythematous, but no dryness  Diminished tactile sensation with monofilament testing throughout the right foot    Socks and shoes removed, Left Foot Findings: swollen, erythematous and dry  Diminished tactile sensation with monofilament testing throughout the left foot    Capillary refills findings on the right were delayed in the toes  Pulses:   1+ in the posterior tibialis on the right   1+ in the dorsalis pedis on the right     Capillary refills findings on the left were delayed in the toes    Pulses:   1+ in the posterior tibialis on the left   1+ in the dorsalis pedis on the left  Hyperkeratosis: present on both first toes-and-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  There is eczema in his heels  There are fissures  There is no cellulitis  Shoe Gear Evaluation: performed ()     Procedure        Procedure: toenail debridement performed on all toenails   Indications for the procedure include professional performance of nail care required due to loss of sensation from diabetes  The procedure's risks were discussed with the patient  Procedure Note: The toenails were debrided using heavy-duty nail nippers-and-with a nail   Post-Procedure: the patient tolerated the procedure well  Complications: there were no complications  Bilateral plantar eczema was debrided down to normal levels  All mycotic nails debrided  Patient tolerated this well without complication her pain      Signatures   Electronically signed by : Kamryn Lock DPM; Sep  6 2017  3:46PM EST                       (Author)     Patient's shoes and socks removed  Right Foot/Ankle   Right Foot Inspection     Toe Exam: swelling  Sensory   Vibration: absent  Proprioception: absent   Monofilament testing: absent  Vascular     The right DP pulse is 1+  The right PT pulse is 1+       Left Foot/Ankle  Left Foot Inspection                 Toe Exam: swelling                   Sensory   Vibration: absent  Proprioception: absent     Vascular     The left DP pulse is 1+  The left PT pulse is 1+  Assign Risk Category:  Deformity present;  No loss of protective sensation;        Risk: 1

## 2018-08-08 ENCOUNTER — OFFICE VISIT (OUTPATIENT)
Dept: PODIATRY | Facility: CLINIC | Age: 73
End: 2018-08-08
Payer: COMMERCIAL

## 2018-08-08 VITALS
HEIGHT: 70 IN | SYSTOLIC BLOOD PRESSURE: 110 MMHG | BODY MASS INDEX: 44.38 KG/M2 | RESPIRATION RATE: 17 BRPM | DIASTOLIC BLOOD PRESSURE: 70 MMHG | WEIGHT: 310 LBS

## 2018-08-08 DIAGNOSIS — M21.969 ACQUIRED DEFORMITY OF FOOT, UNSPECIFIED LATERALITY: Primary | ICD-10-CM

## 2018-08-08 DIAGNOSIS — B35.9 DERMATOPHYTOSIS: ICD-10-CM

## 2018-08-08 DIAGNOSIS — E11.42 DIABETIC POLYNEUROPATHY ASSOCIATED WITH TYPE 2 DIABETES MELLITUS (HCC): ICD-10-CM

## 2018-08-08 DIAGNOSIS — I70.209 ATHEROSCLEROSIS OF ARTERIES OF EXTREMITIES (HCC): ICD-10-CM

## 2018-08-08 DIAGNOSIS — B35.1 ONYCHOMYCOSIS: ICD-10-CM

## 2018-08-08 PROCEDURE — 99213 OFFICE O/P EST LOW 20 MIN: CPT | Performed by: PODIATRIST

## 2018-10-17 ENCOUNTER — OFFICE VISIT (OUTPATIENT)
Dept: PODIATRY | Facility: CLINIC | Age: 73
End: 2018-10-17
Payer: COMMERCIAL

## 2018-10-17 VITALS
SYSTOLIC BLOOD PRESSURE: 118 MMHG | HEIGHT: 70 IN | RESPIRATION RATE: 18 BRPM | WEIGHT: 310 LBS | BODY MASS INDEX: 44.38 KG/M2 | DIASTOLIC BLOOD PRESSURE: 78 MMHG

## 2018-10-17 DIAGNOSIS — M21.969 ACQUIRED DEFORMITY OF FOOT, UNSPECIFIED LATERALITY: ICD-10-CM

## 2018-10-17 DIAGNOSIS — I70.209 ATHEROSCLEROSIS OF ARTERIES OF EXTREMITIES (HCC): Primary | ICD-10-CM

## 2018-10-17 DIAGNOSIS — B35.9 DERMATOPHYTOSIS: ICD-10-CM

## 2018-10-17 DIAGNOSIS — E11.42 DIABETIC POLYNEUROPATHY ASSOCIATED WITH TYPE 2 DIABETES MELLITUS (HCC): ICD-10-CM

## 2018-10-17 DIAGNOSIS — B35.1 ONYCHOMYCOSIS: ICD-10-CM

## 2018-10-17 PROCEDURE — 99213 OFFICE O/P EST LOW 20 MIN: CPT | Performed by: PODIATRIST

## 2018-10-17 NOTE — PROGRESS NOTES
Procedures   Foot Exam        Assessment/Plan:  Pain upon ambulation   Edema   Mycosis of nail and skin      Plan   Patient educated on condition   All abnormal tissue debrided   Nails debrided   Patient will use OTC topical antifungal        No problem-specific Assessment & Plan notes found for this encounter  Review of Systems   Constitutional: Negative     HENT: Negative     Eyes: Negative     Respiratory: Negative     Cardiovascular: Negative     Gastrointestinal: Negative     Endocrine: Negative     Genitourinary: Negative     Musculoskeletal: Negative     Skin: Negative     Allergic/Immunologic: Negative     Neurological: Negative     Hematological: Negative     Psychiatric/Behavioral: Negative           Objective:      Physical Exam   Cardiovascular:   Pulses:       Dorsalis pedis pulses are 1+ on the right side, and 1+ on the left side         Posterior tibial pulses are 1+ on the right side, and 1+ on the left side      Assessment  1  Callus (700) (L84)   2  Diabetes mellitus with neuropathy (250 60,357 2) (E11 40)     Plan   · Follow-up visit in 9 weeks Evaluation and Treatment  Follow-up  Status: Hold For -  Scheduling  Requested for: 46AQA4280   · Diabetes Foot Exam Performed; Status:Complete;   Done: 01BZM6979 03:44PM   · Inspect your feet daily ; Status:Complete;   Done: 50VEO7537 03:44PM   · It is important to take good care of your feet if you have diabetes ; Status:Complete;    Done: 19JKL1060 03:44PM   · There are many things you can do at home to ensure good foot health ; Status:Complete;    Done: 75REP1255 03:44PM   · Wear shoes that give your toes plenty of room ; Status:Complete;   Done: 18LSM7869  03:44PM   · *VB - Foot Exam; Status:Complete; Julian Krause: 61WUX2671 03:31PM     Discussion/Summary     Agent has increasing symptoms of neuropathy  We will add vitamin B complex     The patient was counseled regarding instructions for management,-patient and family education,-importance of compliance with treatment  Patient is able to Self-Care  The treatment plan was reviewed with the patient/guardian  The patient/guardian understands and agrees with the treatment plan      Chief Complaint  nail care      History of Present Illness  HPI: This patient a morbidly obese 75-year-old white male with diabetes mellitus, complains of pain within his feet  He has pain in the heels with walking  He has pain in in his toes with shoe wear  He has no history of trauma  He has not done any treatment area patient also is pain in his left arch  He is having a gouty attack      Active Problems  1  Acute gouty arthritis (274 01) (M10 9)   2  Atherosclerosis of arteries of extremities (440 20) (I70 209)   3  Callus (700) (L84)   4  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)   5  Diabetes mellitus with neuropathy (250 60,357 2) (E11 40)   6  Diabetic neuropathy (250 60,357 2) (E11 40)   7  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)   8  Onychomycosis (110 1) (B35 1)   9  Pes planus, unspecified laterality (734) (M21 40)   10  Tenderness in limb (729 5) (M79 609)     Social History   · Former smoker (T99 76) (Z87 891)   · Never Drank Alcohol     Current Meds   1  Colcrys 0 6 MG Oral Tablet; one tb po tid;   Therapy: 05GBI1665 to (Evaluate:71Sds2192)  Requested for: 15GRP5804; Last   Rx:96Tvs6050 Ordered   2  Colcrys 0 6 MG Oral Tablet; one tb po tid;  Lexie Gitelman: 75PVQ5215 to (Nate Zavala)  Requested for: 61Sgh0269;  Last   Rx:19Rnz7472 Ordered   3  GlipiZIDE 10 MG Oral Tablet;   Therapy: (SHRGFKPV:71YKI1043) to Recorded   4  Januvia 50 MG Oral Tablet;   Therapy: 55BAJ4541 to Recorded   5  Levothyroxine Sodium 75 MCG Oral Tablet;   Therapy: 83SYL7553 to Recorded   6  Lisinopril 20 MG Oral Tablet;   Therapy: (Carol York) to Recorded   7  MetFORMIN HCl - 500 MG Oral Tablet;   Therapy: (TMAYXQGZ:27EOB0987) to Recorded   8  Multi-Vitamin TABS;   Therapy: (Recorded:37Fcb0333) to Recorded   9  Pradaxa 150 MG Oral Capsule;   Therapy: (Recorded:70Gie7670) to Recorded   10  VESIcare 10 MG Oral Tablet;    Therapy: (Recorded:74Wsz1826) to Recorded     The medication list was reviewed and updated today       Allergies  1  No Known Drug Allergies     Vitals        Heart Rate 88   Respiration 18   Systolic 787   Diastolic 82   Height 5 ft 10 in   Weight 288 lb    BMI Calculated 41 32   BSA Calculated 2 44      Physical Exam  Left Foot: Appearance: Normal except as noted: excessive pronation-and-pes planus  Right Foot: Appearance: Normal except as noted: excessive pronation-and-pes planus  Left Ankle: ROM: limited ROM in all planes Motor: diffuse weakness  Right Ankle: ROM: limited ROM in all planes Motor: diffuse weakness  Neurological Exam: performed  Light touch was absent bilaterally  Vibratory sensation was absent bilaterally  Deep tendon reflexes: achilles reflex One/4 bilaterally  Vascular Exam: performed Dorsalis pedis pulses were One/4 bilaterally  Posterior tibial pulses were One/4 bilaterally  Elevation Pallor: present bilaterally  Dependence rubor was absent bilaterally  Capillary refill time was Q  9, findings bilateral  Negative digital hair noted, but-between 1-3 seconds bilaterally  Edema: mild bilaterally-and-2/7 pitting edema  Negative Homans sign, bilateral    Toenails: All of the toenails were elongated,-hypertrophied,-discolored,-shown to have subungual debris,-tender-and-Mycotic with onychauxis       Socks and shoes removed, Right Foot Findings: swollen and erythematous, but no dryness  Diminished tactile sensation with monofilament testing throughout the right foot    Socks and shoes removed, Left Foot Findings: swollen, erythematous and dry  Diminished tactile sensation with monofilament testing throughout the left foot    Capillary refills findings on the right were delayed in the toes  Pulses:   1+ in the posterior tibialis on the right   1+ in the dorsalis pedis on the right     Capillary refills findings on the left were delayed in the toes  Pulses:   1+ in the posterior tibialis on the left   1+ in the dorsalis pedis on the left  Hyperkeratosis: present on both first toes-and-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  There is eczema in his heels  There are fissures  There is no cellulitis  Shoe Gear Evaluation: performed ()     Procedure        Procedure: toenail debridement performed on all toenails   Indications for the procedure include professional performance of nail care required due to loss of sensation from diabetes  The procedure's risks were discussed with the patient  Procedure Note: The toenails were debrided using heavy-duty nail nippers-and-with a nail   Post-Procedure: the patient tolerated the procedure well  Complications: there were no complications  Bilateral plantar eczema was debrided down to normal levels  All mycotic nails debrided  Patient tolerated this well without complication her pain            Patient's shoes and socks removed  Right Foot/Ankle   Right Foot Inspection     Toe Exam: swelling  Sensory   Vibration: absent  Proprioception: absent   Monofilament testing: absent  Vascular     The right DP pulse is 1+  The right PT pulse is 1+       Left Foot/Ankle  Left Foot Inspection                 Toe Exam: swelling                   Sensory   Vibration: absent  Proprioception: absent     Vascular     The left DP pulse is 1+  The left PT pulse is 1+  Assign Risk Category:  Deformity present;  No loss of protective sensation;        Risk: 1

## 2019-05-14 ENCOUNTER — OFFICE VISIT (OUTPATIENT)
Dept: PODIATRY | Facility: CLINIC | Age: 74
End: 2019-05-14
Payer: COMMERCIAL

## 2019-05-14 VITALS
RESPIRATION RATE: 17 BRPM | SYSTOLIC BLOOD PRESSURE: 118 MMHG | WEIGHT: 310 LBS | HEART RATE: 80 BPM | DIASTOLIC BLOOD PRESSURE: 78 MMHG | HEIGHT: 70 IN | BODY MASS INDEX: 44.38 KG/M2

## 2019-05-14 DIAGNOSIS — B35.1 ONYCHOMYCOSIS: ICD-10-CM

## 2019-05-14 DIAGNOSIS — B35.9 DERMATOPHYTOSIS: ICD-10-CM

## 2019-05-14 DIAGNOSIS — M21.969 ACQUIRED DEFORMITY OF FOOT, UNSPECIFIED LATERALITY: ICD-10-CM

## 2019-05-14 DIAGNOSIS — E11.42 DIABETIC POLYNEUROPATHY ASSOCIATED WITH TYPE 2 DIABETES MELLITUS (HCC): Primary | ICD-10-CM

## 2019-05-14 DIAGNOSIS — I70.209 ATHEROSCLEROSIS OF ARTERIES OF EXTREMITIES (HCC): ICD-10-CM

## 2019-05-14 PROCEDURE — 99213 OFFICE O/P EST LOW 20 MIN: CPT | Performed by: PODIATRIST

## 2019-07-16 ENCOUNTER — OFFICE VISIT (OUTPATIENT)
Dept: PODIATRY | Facility: CLINIC | Age: 74
End: 2019-07-16
Payer: COMMERCIAL

## 2019-07-16 VITALS
BODY MASS INDEX: 44.38 KG/M2 | HEIGHT: 70 IN | WEIGHT: 310 LBS | SYSTOLIC BLOOD PRESSURE: 134 MMHG | HEART RATE: 68 BPM | DIASTOLIC BLOOD PRESSURE: 75 MMHG

## 2019-07-16 DIAGNOSIS — M21.969 ACQUIRED DEFORMITY OF FOOT, UNSPECIFIED LATERALITY: ICD-10-CM

## 2019-07-16 DIAGNOSIS — E11.42 DIABETIC POLYNEUROPATHY ASSOCIATED WITH TYPE 2 DIABETES MELLITUS (HCC): Primary | ICD-10-CM

## 2019-07-16 DIAGNOSIS — B35.1 ONYCHOMYCOSIS: ICD-10-CM

## 2019-07-16 DIAGNOSIS — B35.9 DERMATOPHYTOSIS: ICD-10-CM

## 2019-07-16 DIAGNOSIS — I70.209 ATHEROSCLEROSIS OF ARTERIES OF EXTREMITIES (HCC): ICD-10-CM

## 2019-07-16 PROCEDURE — 99213 OFFICE O/P EST LOW 20 MIN: CPT | Performed by: PODIATRIST

## 2019-07-16 NOTE — PROGRESS NOTES
Assessment/Plan:  Pain upon ambulation   Edema   Mycosis of nail and skin      Plan   Patient educated on condition   All abnormal tissue debrided   Nails debrided   Patient will use OTC topical antifungal        No problem-specific Assessment & Plan notes found for this encounter  Review of Systems   Constitutional: Negative     HENT: Negative     Eyes: Negative     Respiratory: Negative     Cardiovascular: Negative     Gastrointestinal: Negative     Endocrine: Negative     Genitourinary: Negative     Musculoskeletal: Negative     Skin: Negative     Allergic/Immunologic: Negative     Neurological: Negative     Hematological: Negative     Psychiatric/Behavioral: Negative           Objective:      Physical Exam   Cardiovascular:   Pulses:       Dorsalis pedis pulses are 1+ on the right side, and 1+ on the left side         Posterior tibial pulses are 1+ on the right side, and 1+ on the left side      Discussion/Summary     Agent has increasing symptoms of neuropathy  We will add vitamin B complex  The patient was counseled regarding instructions for management,-patient and family education,-importance of compliance with treatment  Patient is able to Self-Care  The treatment plan was reviewed with the patient/guardian  The patient/guardian understands and agrees with the treatment plan      Chief Complaint  nail care      History of Present Illness  HPI: This patient a morbidly obese 22-year-old white male with diabetes mellitus, complains of pain within his feet  He has pain in the heels with walking  He has pain in in his toes with shoe wear  He has no history of trauma  He has not done any treatment area patient also is pain in his left arch   He is having a gouty attack      Active Problems  1  Acute gouty arthritis (274 01) (M10 9)   2  Atherosclerosis of arteries of extremities (440 20) (I70 209)   3  Callus (700) (L84)   4  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)   5  Diabetes mellitus with neuropathy (250 60,357 2) (E11 40)   6  Diabetic neuropathy (250 60,357 2) (E11 40)   7  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)   8  Onychomycosis (110 1) (B35 1)   9  Pes planus, unspecified laterality (734) (M21 40)   10  Tenderness in limb (729 5) (M79 609)     Social History   · Former smoker (P16 79) (Z87 891)   · Never Drank Alcohol      The medication list was reviewed and updated today       Allergies  1  No Known Drug Allergies        Physical Exam  Left Foot: Appearance: Normal except as noted: excessive pronation-and-pes planus  Right Foot: Appearance: Normal except as noted: excessive pronation-and-pes planus  Left Ankle: ROM: limited ROM in all planes Motor: diffuse weakness  Right Ankle: ROM: limited ROM in all planes Motor: diffuse weakness  Neurological Exam: performed  Light touch was absent bilaterally  Vibratory sensation was absent bilaterally  Deep tendon reflexes: achilles reflex One/4 bilaterally  Vascular Exam: performed Dorsalis pedis pulses were One/4 bilaterally  Posterior tibial pulses were One/4 bilaterally  Elevation Pallor: present bilaterally  Dependence rubor was absent bilaterally  Capillary refill time was Q  9, findings bilateral  Negative digital hair noted, but-between 1-3 seconds bilaterally  Edema: mild bilaterally-and-2/7 pitting edema  Negative Homans sign, bilateral    Toenails: All of the toenails were elongated,-hypertrophied,-discolored,-shown to have subungual debris,-tender-and-Mycotic with onychauxis       Socks and shoes removed, Right Foot Findings: swollen and erythematous, but no dryness  Diminished tactile sensation with monofilament testing throughout the right foot    Socks and shoes removed, Left Foot Findings: swollen, erythematous and dry  Diminished tactile sensation with monofilament testing throughout the left foot    Capillary refills findings on the right were delayed in the toes     Pulses:   1+ in the posterior tibialis on the right   1+ in the dorsalis pedis on the right  Capillary refills findings on the left were delayed in the toes  Pulses:   1+ in the posterior tibialis on the left   1+ in the dorsalis pedis on the left  Hyperkeratosis: present on both first toes-and-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  There is eczema in his heels  There are fissures  There is no cellulitis  Shoe Gear Evaluation: performed ()     Procedure        Procedure: toenail debridement performed on all toenails   Indications for the procedure include professional performance of nail care required due to loss of sensation from diabetes  The procedure's risks were discussed with the patient  Procedure Note: The toenails were debrided using heavy-duty nail nippers-and-with a nail   Post-Procedure: the patient tolerated the procedure well  Complications: there were no complications  Bilateral plantar eczema was debrided down to normal levels  All mycotic nails debrided  Patient tolerated this well without complication her pain            Patient's shoes and socks removed  Right Foot/Ankle   Right Foot Inspection     Toe Exam: swelling  Sensory   Vibration: absent  Proprioception: absent   Monofilament testing: absent  Vascular     The right DP pulse is 1+  The right PT pulse is 1+       Left Foot/Ankle  Left Foot Inspection                 Toe Exam: swelling                   Sensory   Vibration: absent  Proprioception: absent     Vascular     The left DP pulse is 1+  The left PT pulse is 1+       Patient's shoes and socks removed  Assign Risk Category:  Deformity present;  Loss of protective sensation; Weak pulses       Risk: 2

## 2019-09-17 ENCOUNTER — OFFICE VISIT (OUTPATIENT)
Dept: PODIATRY | Facility: CLINIC | Age: 74
End: 2019-09-17
Payer: COMMERCIAL

## 2019-09-17 VITALS — BODY MASS INDEX: 44.38 KG/M2 | WEIGHT: 310 LBS | HEIGHT: 70 IN | RESPIRATION RATE: 17 BRPM

## 2019-09-17 DIAGNOSIS — M21.969 ACQUIRED DEFORMITY OF FOOT, UNSPECIFIED LATERALITY: ICD-10-CM

## 2019-09-17 DIAGNOSIS — M79.671 PAIN IN BOTH FEET: ICD-10-CM

## 2019-09-17 DIAGNOSIS — E11.42 DIABETIC POLYNEUROPATHY ASSOCIATED WITH TYPE 2 DIABETES MELLITUS (HCC): Primary | ICD-10-CM

## 2019-09-17 DIAGNOSIS — I70.209 ATHEROSCLEROSIS OF ARTERIES OF EXTREMITIES (HCC): ICD-10-CM

## 2019-09-17 DIAGNOSIS — B35.1 ONYCHOMYCOSIS: ICD-10-CM

## 2019-09-17 DIAGNOSIS — B35.9 DERMATOPHYTOSIS: ICD-10-CM

## 2019-09-17 DIAGNOSIS — M79.672 PAIN IN BOTH FEET: ICD-10-CM

## 2019-09-17 PROCEDURE — 99213 OFFICE O/P EST LOW 20 MIN: CPT | Performed by: PODIATRIST

## 2019-09-17 NOTE — PROGRESS NOTES
Assessment/Plan:  Pain upon ambulation   Edema   Mycosis of nail and skin      Plan   Patient educated on condition   All abnormal tissue debrided   Nails debrided   Patient will use OTC topical antifungal        No problem-specific Assessment & Plan notes found for this encounter  Review of Systems   Constitutional: Negative     HENT: Negative     Eyes: Negative     Respiratory: Negative     Cardiovascular: Negative     Gastrointestinal: Negative     Endocrine: Negative     Genitourinary: Negative     Musculoskeletal: Negative     Skin: Negative     Allergic/Immunologic: Negative     Neurological: Negative     Hematological: Negative     Psychiatric/Behavioral: Negative           Objective:      Physical Exam   Cardiovascular:   Pulses:       Dorsalis pedis pulses are 1+ on the right side, and 1+ on the left side         Posterior tibial pulses are 1+ on the right side, and 1+ on the left side      Discussion/Summary     Agent has increasing symptoms of neuropathy  We will add vitamin B complex  The patient was counseled regarding instructions for management,-patient and family education,-importance of compliance with treatment  Patient is able to Self-Care  The treatment plan was reviewed with the patient/guardian  The patient/guardian understands and agrees with the treatment plan      Chief Complaint  nail care      History of Present Illness  HPI: This patient a morbidly obese 69-year-old white male with diabetes mellitus, complains of pain within his feet  He has pain in the heels with walking  He has pain in in his toes with shoe wear  He has no history of trauma  He has not done any treatment area patient also is pain in his left arch   He is having a gouty attack      Active Problems  1  Acute gouty arthritis (274 01) (M10 9)   2  Atherosclerosis of arteries of extremities (440 20) (I70 209)   3  Callus (700) (L84)   4  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)   5  Diabetes mellitus with neuropathy (250 60,357 2) (E11 40)   6  Diabetic neuropathy (250 60,357 2) (E11 40)   7  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)   8  Onychomycosis (110 1) (B35 1)   9  Pes planus, unspecified laterality (734) (M21 40)   10  Tenderness in limb (729 5) (M79 609)     Social History   · Former smoker (X14 92) (Z87 891)   · Never Drank Alcohol      The medication list was reviewed and updated today       Allergies  1  No Known Drug Allergies        Physical Exam  Left Foot: Appearance: Normal except as noted: excessive pronation-and-pes planus  Right Foot: Appearance: Normal except as noted: excessive pronation-and-pes planus  Left Ankle: ROM: limited ROM in all planes Motor: diffuse weakness  Right Ankle: ROM: limited ROM in all planes Motor: diffuse weakness  Neurological Exam: performed  Light touch was absent bilaterally  Vibratory sensation was absent bilaterally  Deep tendon reflexes: achilles reflex One/4 bilaterally  Vascular Exam: performed Dorsalis pedis pulses were One/4 bilaterally  Posterior tibial pulses were One/4 bilaterally  Elevation Pallor: present bilaterally  Dependence rubor was absent bilaterally  Capillary refill time was Q  9, findings bilateral  Negative digital hair noted, but-between 1-3 seconds bilaterally  Edema: mild bilaterally-and-2/7 pitting edema  Negative Homans sign, bilateral    Toenails: All of the toenails were elongated,-hypertrophied,-discolored,-shown to have subungual debris,-tender-and-Mycotic with onychauxis       Socks and shoes removed, Right Foot Findings: swollen and erythematous, but no dryness  Diminished tactile sensation with monofilament testing throughout the right foot    Socks and shoes removed, Left Foot Findings: swollen, erythematous and dry  Diminished tactile sensation with monofilament testing throughout the left foot    Capillary refills findings on the right were delayed in the toes     Pulses:   1+ in the posterior tibialis on the right   1+ in the dorsalis pedis on the right  Capillary refills findings on the left were delayed in the toes  Pulses:   1+ in the posterior tibialis on the left   1+ in the dorsalis pedis on the left  Hyperkeratosis: present on both first toes-and-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  There is eczema in his heels  There are fissures  There is no cellulitis  Shoe Gear Evaluation: performed ()     Procedure        Procedure: toenail debridement performed on all toenails   Indications for the procedure include professional performance of nail care required due to loss of sensation from diabetes  The procedure's risks were discussed with the patient  Procedure Note: The toenails were debrided using heavy-duty nail nippers-and-with a nail   Post-Procedure: the patient tolerated the procedure well  Complications: there were no complications  Bilateral plantar eczema was debrided down to normal levels  All mycotic nails debrided  Patient tolerated this well without complication her pain            Patient's shoes and socks removed  Right Foot/Ankle   Right Foot Inspection     Toe Exam: swelling  Sensory   Vibration: absent  Proprioception: absent   Monofilament testing: absent  Vascular     The right DP pulse is 1+  The right PT pulse is 1+       Left Foot/Ankle  Left Foot Inspection                 Toe Exam: swelling                   Sensory   Vibration: absent  Proprioception: absent     Vascular     The left DP pulse is 1+  The left PT pulse is 1+       Patient's shoes and socks removed  Assign Risk Category:  Deformity present; Loss of protective sensation; Weak pulses       Risk: 2

## 2019-11-19 ENCOUNTER — OFFICE VISIT (OUTPATIENT)
Dept: PODIATRY | Facility: CLINIC | Age: 74
End: 2019-11-19
Payer: COMMERCIAL

## 2019-11-19 VITALS — BODY MASS INDEX: 44.38 KG/M2 | HEIGHT: 70 IN | RESPIRATION RATE: 17 BRPM | WEIGHT: 310 LBS

## 2019-11-19 DIAGNOSIS — B36.9 DERMATOMYCOSIS: ICD-10-CM

## 2019-11-19 DIAGNOSIS — M79.671 PAIN IN BOTH FEET: ICD-10-CM

## 2019-11-19 DIAGNOSIS — M79.672 PAIN IN BOTH FEET: ICD-10-CM

## 2019-11-19 DIAGNOSIS — E11.42 DIABETIC POLYNEUROPATHY ASSOCIATED WITH TYPE 2 DIABETES MELLITUS (HCC): Primary | ICD-10-CM

## 2019-11-19 DIAGNOSIS — S92.515A CLOSED NONDISPLACED FRACTURE OF PROXIMAL PHALANX OF LESSER TOE OF LEFT FOOT, INITIAL ENCOUNTER: ICD-10-CM

## 2019-11-19 PROCEDURE — 73620 X-RAY EXAM OF FOOT: CPT | Performed by: PODIATRIST

## 2019-11-19 PROCEDURE — 99213 OFFICE O/P EST LOW 20 MIN: CPT | Performed by: PODIATRIST

## 2019-11-19 NOTE — PROGRESS NOTES
Assessment/Plan:  Pain upon ambulation   Edema   Mycosis of nail and skin  Digital fracture 2nd left toe     Plan   Patient educated on condition   All abnormal tissue debrided   Nails debrided   Patient will use OTC topical antifungal        No problem-specific Assessment & Plan notes found for this encounter  Review of Systems   Constitutional: Negative     HENT: Negative     Eyes: Negative     Respiratory: Negative     Cardiovascular: Negative     Gastrointestinal: Negative     Endocrine: Negative     Genitourinary: Negative     Musculoskeletal: Negative     Skin: Negative     Allergic/Immunologic: Negative     Neurological: Negative     Hematological: Negative     Psychiatric/Behavioral: Negative           Objective:      Physical Exam   Cardiovascular:   Pulses:       Dorsalis pedis pulses are 1+ on the right side, and 1+ on the left side         Posterior tibial pulses are 1+ on the right side, and 1+ on the left side      Discussion/Summary     Agent has increasing symptoms of neuropathy  We will add vitamin B complex  The patient was counseled regarding instructions for management,-patient and family education,-importance of compliance with treatment  Patient is able to Self-Care  The treatment plan was reviewed with the patient/guardian  The patient/guardian understands and agrees with the treatment plan      Chief Complaint  nail care      History of Present Illness  HPI: This patient a morbidly obese 58-year-old white male with diabetes mellitus, complains of pain within his feet  He has pain in the heels with walking  He has pain in in his toes with shoe wear  He has no history of trauma  He has not done any treatment area patient also is pain in his left arch   He is having a gouty attack      Active Problems  1  Acute gouty arthritis (274 01) (M10 9)   2  Atherosclerosis of arteries of extremities (440 20) (I70 209)   3  Callus (700) (L84)   4  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)   5  Diabetes mellitus with neuropathy (250 60,357 2) (E11 40)   6  Diabetic neuropathy (250 60,357 2) (E11 40)   7  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)   8  Onychomycosis (110 1) (B35 1)   9  Pes planus, unspecified laterality (734) (M21 40)   10  Tenderness in limb (729 5) (M79 609)     Social History   · Former smoker (J76 35) (Z87 601)   · Never Drank Alcohol      The medication list was reviewed and updated today       Allergies  1  No Known Drug Allergies        Physical Exam  Left Foot: Appearance: Normal except as noted: excessive pronation-and-pes planus  Right Foot: Appearance: Normal except as noted: excessive pronation-and-pes planus  Left Ankle: ROM: limited ROM in all planes Motor: diffuse weakness  Right Ankle: ROM: limited ROM in all planes Motor: diffuse weakness  Neurological Exam: performed  Light touch was absent bilaterally  Vibratory sensation was absent bilaterally  Deep tendon reflexes: achilles reflex One/4 bilaterally  Vascular Exam: performed Dorsalis pedis pulses were One/4 bilaterally  Posterior tibial pulses were One/4 bilaterally  Elevation Pallor: present bilaterally  Dependence rubor was absent bilaterally  Capillary refill time was Q  9, findings bilateral  Negative digital hair noted, but-between 1-3 seconds bilaterally  Edema: mild bilaterally-and-2/7 pitting edema  Negative Homans sign, bilateral    Toenails: All of the toenails were elongated,-hypertrophied,-discolored,-shown to have subungual debris,-tender-and-Mycotic with onychauxis       Socks and shoes removed, Right Foot Findings: swollen and erythematous, but no dryness  Diminished tactile sensation with monofilament testing throughout the right foot    Socks and shoes removed, Left Foot Findings: swollen, erythematous and dry  Diminished tactile sensation with monofilament testing throughout the left foot    Capillary refills findings on the right were delayed in the toes     Pulses:   1+ in the posterior tibialis on the right   1+ in the dorsalis pedis on the right  Capillary refills findings on the left were delayed in the toes  Pulses:   1+ in the posterior tibialis on the left   1+ in the dorsalis pedis on the left  Hyperkeratosis: present on both first toes-and-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  There is eczema in his heels  There are fissures  There is no cellulitis  Shoe Gear Evaluation: performed ()     Procedure        Procedure: toenail debridement performed on all toenails   Indications for the procedure include professional performance of nail care required due to loss of sensation from diabetes  The procedure's risks were discussed with the patient  Procedure Note: The toenails were debrided using heavy-duty nail nippers-and-with a nail   Post-Procedure: the patient tolerated the procedure well  Complications: there were no complications  Bilateral plantar eczema was debrided down to normal levels  All mycotic nails debrided  Patient tolerated this well without complication her pain            Patient's shoes and socks removed  Right Foot/Ankle   Right Foot Inspection     Toe Exam: swelling  Sensory   Vibration: absent  Proprioception: absent   Monofilament testing: absent  Vascular     The right DP pulse is 1+  The right PT pulse is 1+       Left Foot/Ankle  Left Foot Inspection                 Toe Exam: swelling                   Sensory   Vibration: absent  Proprioception: absent     Vascular     The left DP pulse is 1+  The left PT pulse is 1+       Patient's shoes and socks removed  Assign Risk Category:  Deformity present; Loss of protective sensation; Weak pulses       Risk: 2    In addition, 2nd left toe demonstrates significant edema and erythema  It has full range of motion without crepitus  X-ray  There is compression fracture of the neck of the proximal phalanx    Toe is aligned

## 2020-01-21 ENCOUNTER — OFFICE VISIT (OUTPATIENT)
Dept: PODIATRY | Facility: CLINIC | Age: 75
End: 2020-01-21
Payer: COMMERCIAL

## 2020-01-21 VITALS — RESPIRATION RATE: 17 BRPM | WEIGHT: 310 LBS | HEIGHT: 70 IN | BODY MASS INDEX: 44.38 KG/M2

## 2020-01-21 DIAGNOSIS — M21.969 ACQUIRED DEFORMITY OF FOOT, UNSPECIFIED LATERALITY: ICD-10-CM

## 2020-01-21 DIAGNOSIS — B35.1 ONYCHOMYCOSIS: ICD-10-CM

## 2020-01-21 DIAGNOSIS — M79.672 PAIN IN BOTH FEET: ICD-10-CM

## 2020-01-21 DIAGNOSIS — B36.9 DERMATOMYCOSIS: ICD-10-CM

## 2020-01-21 DIAGNOSIS — M79.671 PAIN IN BOTH FEET: ICD-10-CM

## 2020-01-21 DIAGNOSIS — E11.42 DIABETIC POLYNEUROPATHY ASSOCIATED WITH TYPE 2 DIABETES MELLITUS (HCC): Primary | ICD-10-CM

## 2020-01-21 PROCEDURE — 99213 OFFICE O/P EST LOW 20 MIN: CPT | Performed by: PODIATRIST

## 2020-01-21 NOTE — PROGRESS NOTES
Assessment/Plan:  Pain upon ambulation   Edema   Mycosis of nail and skin  Digital fracture 2nd left toe     Plan   Patient educated on condition   All abnormal tissue debrided   Nails debrided   Patient will use OTC topical antifungal        No problem-specific Assessment & Plan notes found for this encounter  Review of Systems   Constitutional: Negative     HENT: Negative     Eyes: Negative     Respiratory: Negative     Cardiovascular: Negative     Gastrointestinal: Negative     Endocrine: Negative     Genitourinary: Negative     Musculoskeletal: Negative     Skin: Negative     Allergic/Immunologic: Negative     Neurological: Negative     Hematological: Negative     Psychiatric/Behavioral: Negative           Objective:      Physical Exam   Cardiovascular:   Pulses:       Dorsalis pedis pulses are 1+ on the right side, and 1+ on the left side         Posterior tibial pulses are 1+ on the right side, and 1+ on the left side      Discussion/Summary     Agent has increasing symptoms of neuropathy  We will add vitamin B complex  The patient was counseled regarding instructions for management,-patient and family education,-importance of compliance with treatment  Patient is able to Self-Care  The treatment plan was reviewed with the patient/guardian  The patient/guardian understands and agrees with the treatment plan      Chief Complaint  nail care      History of Present Illness  HPI: This patient a morbidly obese 70-year-old white male with diabetes mellitus, complains of pain within his feet  He has pain in the heels with walking  He has pain in in his toes with shoe wear  He has no history of trauma  He has not done any treatment area patient also is pain in his left arch   He is having a gouty attack      Active Problems  1  Acute gouty arthritis (274 01) (M10 9)   2  Atherosclerosis of arteries of extremities (440 20) (I70 209)   3  Callus (700) (L84)   4  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)   5  Diabetes mellitus with neuropathy (250 60,357 2) (E11 40)   6  Diabetic neuropathy (250 60,357 2) (E11 40)   7  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)   8  Onychomycosis (110 1) (B35 1)   9  Pes planus, unspecified laterality (734) (M21 40)   10  Tenderness in limb (729 5) (M79 609)     Social History   · Former smoker (I10 17) (Z87 891)   · Never Drank Alcohol      The medication list was reviewed and updated today       Allergies  1  No Known Drug Allergies        Physical Exam  Left Foot: Appearance: Normal except as noted: excessive pronation-and-pes planus  Right Foot: Appearance: Normal except as noted: excessive pronation-and-pes planus  Left Ankle: ROM: limited ROM in all planes Motor: diffuse weakness  Right Ankle: ROM: limited ROM in all planes Motor: diffuse weakness  Neurological Exam: performed  Light touch was absent bilaterally  Vibratory sensation was absent bilaterally  Deep tendon reflexes: achilles reflex One/4 bilaterally  Vascular Exam: performed Dorsalis pedis pulses were One/4 bilaterally  Posterior tibial pulses were One/4 bilaterally  Elevation Pallor: present bilaterally  Dependence rubor was absent bilaterally  Capillary refill time was Q  9, findings bilateral  Negative digital hair noted, but-between 1-3 seconds bilaterally  Edema: mild bilaterally-and-2/7 pitting edema  Negative Homans sign, bilateral    Toenails: All of the toenails were elongated,-hypertrophied,-discolored,-shown to have subungual debris,-tender-and-Mycotic with onychauxis       Socks and shoes removed, Right Foot Findings: swollen and erythematous, but no dryness  Diminished tactile sensation with monofilament testing throughout the right foot    Socks and shoes removed, Left Foot Findings: swollen, erythematous and dry  Diminished tactile sensation with monofilament testing throughout the left foot    Capillary refills findings on the right were delayed in the toes     Pulses:   1+ in the posterior tibialis on the right   1+ in the dorsalis pedis on the right  Capillary refills findings on the left were delayed in the toes  Pulses:   1+ in the posterior tibialis on the left   1+ in the dorsalis pedis on the left  Hyperkeratosis: present on both first toes-and-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  There is eczema in his heels  There are fissures  There is no cellulitis  Shoe Gear Evaluation: performed ()     Procedure        Procedure: toenail debridement performed on all toenails   Indications for the procedure include professional performance of nail care required due to loss of sensation from diabetes  The procedure's risks were discussed with the patient  Procedure Note: The toenails were debrided using heavy-duty nail nippers-and-with a nail   Post-Procedure: the patient tolerated the procedure well  Complications: there were no complications  Bilateral plantar eczema was debrided down to normal levels  All mycotic nails debrided  Patient tolerated this well without complication her pain            Patient's shoes and socks removed  Right Foot/Ankle   Right Foot Inspection     Toe Exam: swelling  Sensory   Vibration: absent  Proprioception: absent   Monofilament testing: absent  Vascular     The right DP pulse is 1+  The right PT pulse is 1+       Left Foot/Ankle  Left Foot Inspection                 Toe Exam: swelling                   Sensory   Vibration: absent  Proprioception: absent     Vascular     The left DP pulse is 1+  The left PT pulse is 1+      Patient's shoes and socks removed  Assign Risk Category:  Deformity present;  Loss of protective sensation; Weak pulses       Risk: 2

## 2020-07-07 ENCOUNTER — OFFICE VISIT (OUTPATIENT)
Dept: PODIATRY | Facility: CLINIC | Age: 75
End: 2020-07-07
Payer: COMMERCIAL

## 2020-07-07 VITALS — HEIGHT: 70 IN | RESPIRATION RATE: 17 BRPM | BODY MASS INDEX: 44.38 KG/M2 | WEIGHT: 310 LBS

## 2020-07-07 DIAGNOSIS — B35.1 ONYCHOMYCOSIS: ICD-10-CM

## 2020-07-07 DIAGNOSIS — M79.672 PAIN IN BOTH FEET: ICD-10-CM

## 2020-07-07 DIAGNOSIS — M21.969 ACQUIRED DEFORMITY OF FOOT, UNSPECIFIED LATERALITY: ICD-10-CM

## 2020-07-07 DIAGNOSIS — E11.42 DIABETIC POLYNEUROPATHY ASSOCIATED WITH TYPE 2 DIABETES MELLITUS (HCC): Primary | ICD-10-CM

## 2020-07-07 DIAGNOSIS — B36.9 DERMATOMYCOSIS: ICD-10-CM

## 2020-07-07 DIAGNOSIS — M79.671 PAIN IN BOTH FEET: ICD-10-CM

## 2020-07-07 PROCEDURE — 99213 OFFICE O/P EST LOW 20 MIN: CPT | Performed by: PODIATRIST

## 2020-07-07 NOTE — PROGRESS NOTES
Assessment/Plan:  Pain upon ambulation   Edema   Mycosis of nail and skin   Digital fracture 2nd left toe     Plan   Patient educated on condition   All abnormal tissue debrided   Nails debrided   Patient will use OTC topical antifungal        No problem-specific Assessment & Plan notes found for this encounter  Review of Systems   Constitutional: Negative     HENT: Negative     Eyes: Negative     Respiratory: Negative     Cardiovascular: Negative     Gastrointestinal: Negative     Endocrine: Negative     Genitourinary: Negative     Musculoskeletal: Negative     Skin: Negative     Allergic/Immunologic: Negative     Neurological: Negative     Hematological: Negative     Psychiatric/Behavioral: Negative           Objective:      Physical Exam   Cardiovascular:   Pulses:       Dorsalis pedis pulses are 1+ on the right side, and 1+ on the left side         Posterior tibial pulses are 1+ on the right side, and 1+ on the left side      Discussion/Summary     Agent has increasing symptoms of neuropathy  We will add vitamin B complex  The patient was counseled regarding instructions for management,-patient and family education,-importance of compliance with treatment  Patient is able to Self-Care  The treatment plan was reviewed with the patient/guardian  The patient/guardian understands and agrees with the treatment plan      Chief Complaint  nail care      History of Present Illness  HPI: This patient a morbidly obese 57-year-old white male with diabetes mellitus, complains of pain within his feet  He has pain in the heels with walking  He has pain in in his toes with shoe wear  He has no history of trauma  He has not done any treatment area patient also is pain in his left arch   He is having a gouty attack      Active Problems  1  Acute gouty arthritis (274 01) (M10 9)   2  Atherosclerosis of arteries of extremities (440 20) (I70 209)   3  Callus (700) (L84)   4  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)   5  Diabetes mellitus with neuropathy (250 60,357 2) (E11 40)   6  Diabetic neuropathy (250 60,357 2) (E11 40)   7  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)   8  Onychomycosis (110 1) (B35 1)   9  Pes planus, unspecified laterality (734) (M21 40)   10  Tenderness in limb (729 5) (M79 609)     Social History   · Former smoker (F76 90) (Z87 891)   · Never Drank Alcohol      The medication list was reviewed and updated today       Allergies  1  No Known Drug Allergies        Physical Exam  Left Foot: Appearance: Normal except as noted: excessive pronation-and-pes planus  Right Foot: Appearance: Normal except as noted: excessive pronation-and-pes planus  Left Ankle: ROM: limited ROM in all planes Motor: diffuse weakness  Right Ankle: ROM: limited ROM in all planes Motor: diffuse weakness  Neurological Exam: performed  Light touch was absent bilaterally  Vibratory sensation was absent bilaterally  Deep tendon reflexes: achilles reflex One/4 bilaterally  Vascular Exam: performed Dorsalis pedis pulses were One/4 bilaterally  Posterior tibial pulses were One/4 bilaterally  Elevation Pallor: present bilaterally  Dependence rubor was absent bilaterally  Capillary refill time was Q  9, findings bilateral  Negative digital hair noted, but-between 1-3 seconds bilaterally  Edema: mild bilaterally-and-2/7 pitting edema  Negative Homans sign, bilateral    Toenails: All of the toenails were elongated,-hypertrophied,-discolored,-shown to have subungual debris,-tender-and-Mycotic with onychauxis       Socks and shoes removed, Right Foot Findings: swollen and erythematous, but no dryness  Diminished tactile sensation with monofilament testing throughout the right foot    Socks and shoes removed, Left Foot Findings: swollen, erythematous and dry  Diminished tactile sensation with monofilament testing throughout the left foot    Capillary refills findings on the right were delayed in the toes     Pulses:   1+ in the posterior tibialis on the right   1+ in the dorsalis pedis on the right  Capillary refills findings on the left were delayed in the toes  Pulses:   1+ in the posterior tibialis on the left   1+ in the dorsalis pedis on the left  Hyperkeratosis: present on both first toes-and-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  There is eczema in his heels  There are fissures  There is no cellulitis  Shoe Gear Evaluation: performed ()     Procedure        Procedure: toenail debridement performed on all toenails   Indications for the procedure include professional performance of nail care required due to loss of sensation from diabetes  The procedure's risks were discussed with the patient  Procedure Note: The toenails were debrided using heavy-duty nail nippers-and-with a nail   Post-Procedure: the patient tolerated the procedure well  Complications: there were no complications  Bilateral plantar eczema was debrided down to normal levels  All mycotic nails debrided  Patient tolerated this well without complication her pain            Patient's shoes and socks removed  Right Foot/Ankle   Right Foot Inspection     Toe Exam: swelling  Sensory   Vibration: absent  Proprioception: absent   Monofilament testing: absent  Vascular     The right DP pulse is 1+  The right PT pulse is 1+       Left Foot/Ankle  Left Foot Inspection                 Toe Exam: swelling                   Sensory   Vibration: absent  Proprioception: absent     Vascular     The left DP pulse is 1+  The left PT pulse is 1+       Patient's shoes and socks removed  Assign Risk Category:  Deformity present;  Loss of protective sensation; Weak pulses       Risk: 2

## 2020-09-29 ENCOUNTER — OFFICE VISIT (OUTPATIENT)
Dept: PODIATRY | Facility: CLINIC | Age: 75
End: 2020-09-29
Payer: COMMERCIAL

## 2020-09-29 VITALS — RESPIRATION RATE: 17 BRPM | WEIGHT: 310 LBS | BODY MASS INDEX: 44.38 KG/M2 | HEIGHT: 70 IN

## 2020-09-29 DIAGNOSIS — M79.672 PAIN IN BOTH FEET: ICD-10-CM

## 2020-09-29 DIAGNOSIS — E11.42 DIABETIC POLYNEUROPATHY ASSOCIATED WITH TYPE 2 DIABETES MELLITUS (HCC): Primary | ICD-10-CM

## 2020-09-29 DIAGNOSIS — I70.209 ATHEROSCLEROSIS OF ARTERIES OF EXTREMITIES (HCC): ICD-10-CM

## 2020-09-29 DIAGNOSIS — B35.1 ONYCHOMYCOSIS: ICD-10-CM

## 2020-09-29 DIAGNOSIS — M79.671 PAIN IN BOTH FEET: ICD-10-CM

## 2020-09-29 PROCEDURE — 11721 DEBRIDE NAIL 6 OR MORE: CPT | Performed by: PODIATRIST

## 2020-09-29 NOTE — PROGRESS NOTES
Assessment/Plan:  Pain upon ambulation   Edema   Mycosis of nail and skin       Plan   Patient educated on condition   All abnormal tissue debrided   Nails debrided   Patient will use OTC topical antifungal        No problem-specific Assessment & Plan notes found for this encounter  Review of Systems   Constitutional: Negative     HENT: Negative     Eyes: Negative     Respiratory: Negative     Cardiovascular: Negative     Gastrointestinal: Negative     Endocrine: Negative     Genitourinary: Negative     Musculoskeletal: Negative     Skin: Negative     Allergic/Immunologic: Negative     Neurological: Negative     Hematological: Negative     Psychiatric/Behavioral: Negative           Objective:      Physical Exam   Cardiovascular:   Pulses:       Dorsalis pedis pulses are 1+ on the right side, and 1+ on the left side         Posterior tibial pulses are 1+ on the right side, and 1+ on the left side      Discussion/Summary     Agent has increasing symptoms of neuropathy  We will add vitamin B complex  The patient was counseled regarding instructions for management,-patient and family education,-importance of compliance with treatment  Patient is able to Self-Care  The treatment plan was reviewed with the patient/guardian  The patient/guardian understands and agrees with the treatment plan      Chief Complaint  nail care      History of Present Illness  HPI: This patient a morbidly obese 60-year-old white male with diabetes mellitus, complains of pain within his feet  He has pain in the heels with walking  He has pain in in his toes with shoe wear  He has no history of trauma  He has not done any treatment area patient also is pain in his left arch   He is having a gouty attack      Active Problems  1  Acute gouty arthritis (274 01) (M10 9)   2  Atherosclerosis of arteries of extremities (440 20) (I70 209)   3  Callus (700) (L84)   4  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)   5  Diabetes mellitus with neuropathy (250 60,357 2) (E11 40)   6  Diabetic neuropathy (250 60,357 2) (E11 40)   7  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)   8  Onychomycosis (110 1) (B35 1)   9  Pes planus, unspecified laterality (734) (M21 40)   10  Tenderness in limb (729 5) (M79 609)     Social History   · Former smoker (M11 97) (Z87 891)   · Never Drank Alcohol      The medication list was reviewed and updated today       Allergies  1  No Known Drug Allergies        Physical Exam  Left Foot: Appearance: Normal except as noted: excessive pronation-and-pes planus  Right Foot: Appearance: Normal except as noted: excessive pronation-and-pes planus  Left Ankle: ROM: limited ROM in all planes Motor: diffuse weakness  Right Ankle: ROM: limited ROM in all planes Motor: diffuse weakness  Neurological Exam: performed  Light touch was absent bilaterally  Vibratory sensation was absent bilaterally  Deep tendon reflexes: achilles reflex One/4 bilaterally  Vascular Exam: performed Dorsalis pedis pulses were One/4 bilaterally  Posterior tibial pulses were One/4 bilaterally  Elevation Pallor: present bilaterally  Dependence rubor was absent bilaterally  Capillary refill time was Q  9, findings bilateral  Negative digital hair noted, but-between 1-3 seconds bilaterally  Edema: mild bilaterally-and-2/7 pitting edema  Negative Homans sign, bilateral    Toenails: All of the toenails were elongated,-hypertrophied,-discolored,-shown to have subungual debris,-tender-and-Mycotic with onychauxis       Socks and shoes removed, Right Foot Findings: swollen and erythematous, but no dryness  Diminished tactile sensation with monofilament testing throughout the right foot    Socks and shoes removed, Left Foot Findings: swollen, erythematous and dry  Diminished tactile sensation with monofilament testing throughout the left foot    Capillary refills findings on the right were delayed in the toes     Pulses:   1+ in the posterior tibialis on the right   1+ in the dorsalis pedis on the right  Capillary refills findings on the left were delayed in the toes  Pulses:   1+ in the posterior tibialis on the left   1+ in the dorsalis pedis on the left  Hyperkeratosis: present on both first toes-and-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  There is eczema in his heels  There are fissures  There is no cellulitis  Shoe Gear Evaluation: performed ()     Procedure        Procedure: toenail debridement performed on all toenails   Indications for the procedure include professional performance of nail care required due to loss of sensation from diabetes  The procedure's risks were discussed with the patient  Procedure Note: The toenails were debrided using heavy-duty nail nippers-and-with a nail   Post-Procedure: the patient tolerated the procedure well  Complications: there were no complications  Bilateral plantar eczema was debrided down to normal levels  All mycotic nails debrided  Patient tolerated this well without complication her pain            Patient's shoes and socks removed  Right Foot/Ankle   Right Foot Inspection     Toe Exam: swelling  Sensory   Vibration: absent  Proprioception: absent   Monofilament testing: absent  Vascular     The right DP pulse is 1+  The right PT pulse is 1+       Left Foot/Ankle  Left Foot Inspection                 Toe Exam: swelling                   Sensory   Vibration: absent  Proprioception: absent     Vascular     The left DP pulse is 1+  The left PT pulse is 1+       Patient's shoes and socks removed  Assign Risk Category:  Deformity present; Loss of protective sensation; Weak pulses       Risk: 2

## 2020-12-08 ENCOUNTER — OFFICE VISIT (OUTPATIENT)
Dept: PODIATRY | Facility: CLINIC | Age: 75
End: 2020-12-08
Payer: COMMERCIAL

## 2020-12-08 VITALS — RESPIRATION RATE: 17 BRPM | BODY MASS INDEX: 44.38 KG/M2 | WEIGHT: 310 LBS | HEIGHT: 70 IN

## 2020-12-08 DIAGNOSIS — E11.42 DIABETIC POLYNEUROPATHY ASSOCIATED WITH TYPE 2 DIABETES MELLITUS (HCC): Primary | ICD-10-CM

## 2020-12-08 DIAGNOSIS — M79.671 PAIN IN BOTH FEET: ICD-10-CM

## 2020-12-08 DIAGNOSIS — I70.209 ATHEROSCLEROSIS OF ARTERIES OF EXTREMITIES (HCC): ICD-10-CM

## 2020-12-08 DIAGNOSIS — M79.672 PAIN IN BOTH FEET: ICD-10-CM

## 2020-12-08 DIAGNOSIS — B35.1 ONYCHOMYCOSIS: ICD-10-CM

## 2020-12-08 PROCEDURE — 11721 DEBRIDE NAIL 6 OR MORE: CPT | Performed by: PODIATRIST

## 2021-02-16 ENCOUNTER — OFFICE VISIT (OUTPATIENT)
Dept: PODIATRY | Facility: CLINIC | Age: 76
End: 2021-02-16
Payer: COMMERCIAL

## 2021-02-16 VITALS
BODY MASS INDEX: 44.38 KG/M2 | WEIGHT: 310 LBS | HEART RATE: 79 BPM | SYSTOLIC BLOOD PRESSURE: 165 MMHG | HEIGHT: 70 IN | DIASTOLIC BLOOD PRESSURE: 92 MMHG | RESPIRATION RATE: 17 BRPM

## 2021-02-16 DIAGNOSIS — M79.671 PAIN IN BOTH FEET: ICD-10-CM

## 2021-02-16 DIAGNOSIS — E11.42 DIABETIC POLYNEUROPATHY ASSOCIATED WITH TYPE 2 DIABETES MELLITUS (HCC): Primary | ICD-10-CM

## 2021-02-16 DIAGNOSIS — M79.672 PAIN IN BOTH FEET: ICD-10-CM

## 2021-02-16 DIAGNOSIS — B35.1 ONYCHOMYCOSIS: ICD-10-CM

## 2021-02-16 DIAGNOSIS — I70.209 ATHEROSCLEROSIS OF ARTERIES OF EXTREMITIES (HCC): ICD-10-CM

## 2021-02-16 PROCEDURE — 11721 DEBRIDE NAIL 6 OR MORE: CPT | Performed by: PODIATRIST

## 2021-02-16 NOTE — PROGRESS NOTES
Assessment/Plan:  Pain upon ambulation   Edema   Mycosis of nail and skin       Plan   Patient educated on condition   All abnormal tissue debrided   Nails debrided   Patient will use OTC topical antifungal        Subjective  Patient complains of pain in his feet with ambulation  He has pain when he wears shoes  No history of trauma  Patient is diabetic           Review of Systems   Constitutional: Negative     HENT: Negative     Eyes: Negative     Respiratory: Negative     Cardiovascular: Negative     Gastrointestinal: Negative     Endocrine: Negative     Genitourinary: Negative     Musculoskeletal: Negative     Skin: Negative     Allergic/Immunologic: Negative     Neurological: Negative     Hematological: Negative     Psychiatric/Behavioral: Negative           Objective:      Physical Exam   Cardiovascular:   Pulses:       Dorsalis pedis pulses are 1+ on the right side, and 1+ on the left side         Posterior tibial pulses are 1+ on the right side, and 1+ on the left side      Discussion/Summary     Agent has increasing symptoms of neuropathy  We will add vitamin B complex  The patient was counseled regarding instructions for management,-patient and family education,-importance of compliance with treatment  Patient is able to Self-Care  The treatment plan was reviewed with the patient/guardian  The patient/guardian understands and agrees with the treatment plan      Chief Complaint  nail care      History of Present Illness  HPI: This patient a morbidly obese 51-year-old white male with diabetes mellitus, complains of pain within his feet  He has pain in the heels with walking  He has pain in in his toes with shoe wear  He has no history of trauma  He has not done any treatment area patient also is pain in his left arch   He is having a gouty attack      Active Problems  1  Acute gouty arthritis (274 01) (M10 9)   2  Atherosclerosis of arteries of extremities (440 20) (I70 209)   3  Callus (700) (L84)   4  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)   5  Diabetes mellitus with neuropathy (250 60,357 2) (E11 40)   6  Diabetic neuropathy (250 60,357 2) (E11 40)   7  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)   8  Onychomycosis (110 1) (B35 1)   9  Pes planus, unspecified laterality (734) (M21 40)   10  Tenderness in limb (729 5) (M79 609)     Social History   · Former smoker (Q95 90) (Z87 891)   · Never Drank Alcohol      The medication list was reviewed and updated today       Allergies  1  No Known Drug Allergies        Physical Exam  Left Foot: Appearance: Normal except as noted: excessive pronation-and-pes planus  Right Foot: Appearance: Normal except as noted: excessive pronation-and-pes planus  Left Ankle: ROM: limited ROM in all planes Motor: diffuse weakness  Right Ankle: ROM: limited ROM in all planes Motor: diffuse weakness  Neurological Exam: performed  Light touch was absent bilaterally  Vibratory sensation was absent bilaterally  Deep tendon reflexes: achilles reflex One/4 bilaterally  Vascular Exam: performed Dorsalis pedis pulses were One/4 bilaterally  Posterior tibial pulses were One/4 bilaterally  Elevation Pallor: present bilaterally  Dependence rubor was absent bilaterally  Capillary refill time was Q  9, findings bilateral  Negative digital hair noted, but-between 1-3 seconds bilaterally  Edema: mild bilaterally-and-2/7 pitting edema  Negative Homans sign, bilateral    Toenails: All of the toenails were elongated,-hypertrophied,-discolored,-shown to have subungual debris,-tender-and-Mycotic with onychauxis       Socks and shoes removed, Right Foot Findings: swollen and erythematous, but no dryness  Diminished tactile sensation with monofilament testing throughout the right foot    Socks and shoes removed, Left Foot Findings: swollen, erythematous and dry  Diminished tactile sensation with monofilament testing throughout the left foot    Capillary refills findings on the right were delayed in the toes  Pulses:   1+ in the posterior tibialis on the right   1+ in the dorsalis pedis on the right  Capillary refills findings on the left were delayed in the toes  Pulses:   1+ in the posterior tibialis on the left   1+ in the dorsalis pedis on the left  Hyperkeratosis: present on both first toes-and-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  There is eczema in his heels  There are fissures  There is no cellulitis  Shoe Gear Evaluation: performed ()     Procedure        Procedure: toenail debridement performed on all toenails   Indications for the procedure include professional performance of nail care required due to loss of sensation from diabetes  The procedure's risks were discussed with the patient  Procedure Note: The toenails were debrided using heavy-duty nail nippers-and-with a nail   Post-Procedure: the patient tolerated the procedure well  Complications: there were no complications  Bilateral plantar eczema was debrided down to normal levels  All mycotic nails debrided  Patient tolerated this well without complication her pain            Patient's shoes and socks removed  Right Foot/Ankle   Right Foot Inspection     Toe Exam: swelling  Sensory   Vibration: absent  Proprioception: absent   Monofilament testing: absent  Vascular     The right DP pulse is 1+  The right PT pulse is 1+       Left Foot/Ankle  Left Foot Inspection                 Toe Exam: swelling                   Sensory   Vibration: absent  Proprioception: absent     Vascular     The left DP pulse is 1+  The left PT pulse is 1+      Patient's shoes and socks removed  Assign Risk Category:  Deformity present;  Loss of protective sensation; Weak pulses       Risk: 2

## 2021-04-20 ENCOUNTER — OFFICE VISIT (OUTPATIENT)
Dept: PODIATRY | Facility: CLINIC | Age: 76
End: 2021-04-20
Payer: COMMERCIAL

## 2021-04-20 VITALS — BODY MASS INDEX: 44.38 KG/M2 | HEIGHT: 70 IN | RESPIRATION RATE: 17 BRPM | WEIGHT: 310 LBS

## 2021-04-20 DIAGNOSIS — I70.209 ATHEROSCLEROSIS OF ARTERIES OF EXTREMITIES (HCC): ICD-10-CM

## 2021-04-20 DIAGNOSIS — B35.1 ONYCHOMYCOSIS: ICD-10-CM

## 2021-04-20 DIAGNOSIS — M79.672 PAIN IN BOTH FEET: ICD-10-CM

## 2021-04-20 DIAGNOSIS — E11.42 DIABETIC POLYNEUROPATHY ASSOCIATED WITH TYPE 2 DIABETES MELLITUS (HCC): Primary | ICD-10-CM

## 2021-04-20 DIAGNOSIS — M79.671 PAIN IN BOTH FEET: ICD-10-CM

## 2021-04-20 DIAGNOSIS — B36.9 DERMATOMYCOSIS: ICD-10-CM

## 2021-04-20 PROCEDURE — 11721 DEBRIDE NAIL 6 OR MORE: CPT | Performed by: PODIATRIST

## 2021-04-20 NOTE — PROGRESS NOTES
Assessment/Plan:  Pain upon ambulation   Edema   Mycosis of nail and skin       Plan   Patient educated on condition   All abnormal tissue debrided   Nails debrided   Patient will use OTC topical antifungal        Subjective   Patient complains of pain in his feet with ambulation   He has pain when he wears shoes   No history of trauma   Patient is diabetic           Review of Systems   Constitutional: Negative     HENT: Negative     Eyes: Negative     Respiratory: Negative     Cardiovascular: Negative     Gastrointestinal: Negative     Endocrine: Negative     Genitourinary: Negative     Musculoskeletal: Negative     Skin: Negative     Allergic/Immunologic: Negative     Neurological: Negative     Hematological: Negative     Psychiatric/Behavioral: Negative           Objective:      Physical Exam   Cardiovascular:   Pulses:       Dorsalis pedis pulses are 1+ on the right side, and 1+ on the left side         Posterior tibial pulses are 1+ on the right side, and 1+ on the left side      Discussion/Summary     Agent has increasing symptoms of neuropathy  We will add vitamin B complex  The patient was counseled regarding instructions for management,-patient and family education,-importance of compliance with treatment  Patient is able to Self-Care  The treatment plan was reviewed with the patient/guardian  The patient/guardian understands and agrees with the treatment plan      Chief Complaint  nail care      History of Present Illness  HPI: This patient a morbidly obese 40-year-old white male with diabetes mellitus, complains of pain within his feet  He has pain in the heels with walking  He has pain in in his toes with shoe wear  He has no history of trauma  He has not done any treatment area patient also is pain in his left arch   He is having a gouty attack      Active Problems  1  Acute gouty arthritis (274 01) (M10 9)   2  Atherosclerosis of arteries of extremities (440 20) (I70 209)   3  Callus (700) (L84)   4  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)   5  Diabetes mellitus with neuropathy (250 60,357 2) (E11 40)   6  Diabetic neuropathy (250 60,357 2) (E11 40)   7  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)   8  Onychomycosis (110 1) (B35 1)   9  Pes planus, unspecified laterality (734) (M21 40)   10  Tenderness in limb (729 5) (M79 609)     Social History   · Former smoker (J08 72) (Z87 891)   · Never Drank Alcohol      The medication list was reviewed and updated today       Allergies  1  No Known Drug Allergies        Physical Exam  Left Foot: Appearance: Normal except as noted: excessive pronation-and-pes planus  Right Foot: Appearance: Normal except as noted: excessive pronation-and-pes planus  Left Ankle: ROM: limited ROM in all planes Motor: diffuse weakness  Right Ankle: ROM: limited ROM in all planes Motor: diffuse weakness  Neurological Exam: performed  Light touch was absent bilaterally  Vibratory sensation was absent bilaterally  Deep tendon reflexes: achilles reflex One/4 bilaterally  Vascular Exam: performed Dorsalis pedis pulses were One/4 bilaterally  Posterior tibial pulses were One/4 bilaterally  Elevation Pallor: present bilaterally  Dependence rubor was absent bilaterally  Capillary refill time was Q  9, findings bilateral  Negative digital hair noted, but-between 1-3 seconds bilaterally  Edema: mild bilaterally-and-2/7 pitting edema  Negative Homans sign, bilateral    Toenails: All of the toenails were elongated,-hypertrophied,-discolored,-shown to have subungual debris,-tender-and-Mycotic with onychauxis       Socks and shoes removed, Right Foot Findings: swollen and erythematous, but no dryness  Diminished tactile sensation with monofilament testing throughout the right foot    Socks and shoes removed, Left Foot Findings: swollen, erythematous and dry  Diminished tactile sensation with monofilament testing throughout the left foot    Capillary refills findings on the right were delayed in the toes  Pulses:   1+ in the posterior tibialis on the right   1+ in the dorsalis pedis on the right  Capillary refills findings on the left were delayed in the toes  Pulses:   1+ in the posterior tibialis on the left   1+ in the dorsalis pedis on the left  Hyperkeratosis: present on both first toes-and-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  There is eczema in his heels  There are fissures  There is no cellulitis  Shoe Gear Evaluation: performed ()     Procedure        Procedure: toenail debridement performed on all toenails   Indications for the procedure include professional performance of nail care required due to loss of sensation from diabetes  The procedure's risks were discussed with the patient  Procedure Note: The toenails were debrided using heavy-duty nail nippers-and-with a nail   Post-Procedure: the patient tolerated the procedure well  Complications: there were no complications  Bilateral plantar eczema was debrided down to normal levels  All mycotic nails debrided  Patient tolerated this well without complication her pain            Patient's shoes and socks removed  Right Foot/Ankle   Right Foot Inspection     Toe Exam: swelling  Sensory   Vibration: absent  Proprioception: absent   Monofilament testing: absent  Vascular     The right DP pulse is 1+  The right PT pulse is 1+       Left Foot/Ankle  Left Foot Inspection                 Toe Exam: swelling                   Sensory   Vibration: absent  Proprioception: absent     Vascular     The left DP pulse is 1+  The left PT pulse is 1+      Patient's shoes and socks removed  Assign Risk Category:  Deformity present;  Loss of protective sensation; Weak pulses       Risk: 2

## 2021-06-22 ENCOUNTER — OFFICE VISIT (OUTPATIENT)
Dept: PODIATRY | Facility: CLINIC | Age: 76
End: 2021-06-22
Payer: COMMERCIAL

## 2021-06-22 VITALS — BODY MASS INDEX: 44.38 KG/M2 | RESPIRATION RATE: 17 BRPM | HEIGHT: 70 IN | WEIGHT: 310 LBS

## 2021-06-22 DIAGNOSIS — B35.1 ONYCHOMYCOSIS: ICD-10-CM

## 2021-06-22 DIAGNOSIS — I70.209 ATHEROSCLEROSIS OF ARTERIES OF EXTREMITIES (HCC): ICD-10-CM

## 2021-06-22 DIAGNOSIS — M79.671 PAIN IN BOTH FEET: ICD-10-CM

## 2021-06-22 DIAGNOSIS — E11.42 DIABETIC POLYNEUROPATHY ASSOCIATED WITH TYPE 2 DIABETES MELLITUS (HCC): Primary | ICD-10-CM

## 2021-06-22 DIAGNOSIS — M79.672 PAIN IN BOTH FEET: ICD-10-CM

## 2021-06-22 PROCEDURE — 11721 DEBRIDE NAIL 6 OR MORE: CPT | Performed by: PODIATRIST

## 2021-06-22 NOTE — PROGRESS NOTES
Assessment/Plan:  Pain upon ambulation   Edema   Mycosis of nail and skin       Plan   Patient educated on condition   All abnormal tissue debrided   Nails debrided   Patient will use OTC topical antifungal        Subjective   Patient complains of pain in his feet with ambulation   He has pain when he wears shoes   No history of trauma   Patient is diabetic           Review of Systems   Constitutional: Negative     HENT: Negative     Eyes: Negative     Respiratory: Negative     Cardiovascular: Negative     Gastrointestinal: Negative     Endocrine: Negative     Genitourinary: Negative     Musculoskeletal: Negative     Skin: Negative     Allergic/Immunologic: Negative     Neurological: Negative     Hematological: Negative     Psychiatric/Behavioral: Negative           Objective:      Physical Exam   Cardiovascular:   Pulses:       Dorsalis pedis pulses are 1+ on the right side, and 1+ on the left side         Posterior tibial pulses are 1+ on the right side, and 1+ on the left side      Discussion/Summary     Agent has increasing symptoms of neuropathy  We will add vitamin B complex  The patient was counseled regarding instructions for management,-patient and family education,-importance of compliance with treatment  Patient is able to Self-Care  The treatment plan was reviewed with the patient/guardian  The patient/guardian understands and agrees with the treatment plan      Chief Complaint  nail care      History of Present Illness  HPI: This patient a morbidly obese 51-year-old white male with diabetes mellitus, complains of pain within his feet  He has pain in the heels with walking  He has pain in in his toes with shoe wear  He has no history of trauma  He has not done any treatment area patient also is pain in his left arch   He is having a gouty attack      Active Problems  1  Acute gouty arthritis (274 01) (M10 9)   2  Atherosclerosis of arteries of extremities (440 20) (I70 209)   3  Callus (700) (L84)   4  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)   5  Diabetes mellitus with neuropathy (250 60,357 2) (E11 40)   6  Diabetic neuropathy (250 60,357 2) (E11 40)   7  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)   8  Onychomycosis (110 1) (B35 1)   9  Pes planus, unspecified laterality (734) (M21 40)   10  Tenderness in limb (729 5) (M79 609)     Social History   · Former smoker (B29 94) (Z87 891)   · Never Drank Alcohol      The medication list was reviewed and updated today       Allergies  1  No Known Drug Allergies        Physical Exam  Left Foot: Appearance: Normal except as noted: excessive pronation-and-pes planus  Right Foot: Appearance: Normal except as noted: excessive pronation-and-pes planus  Left Ankle: ROM: limited ROM in all planes Motor: diffuse weakness  Right Ankle: ROM: limited ROM in all planes Motor: diffuse weakness  Neurological Exam: performed  Light touch was absent bilaterally  Vibratory sensation was absent bilaterally  Deep tendon reflexes: achilles reflex One/4 bilaterally  Vascular Exam: performed Dorsalis pedis pulses were One/4 bilaterally  Posterior tibial pulses were One/4 bilaterally  Elevation Pallor: present bilaterally  Dependence rubor was absent bilaterally  Capillary refill time was Q  9, findings bilateral  Negative digital hair noted, but-between 1-3 seconds bilaterally  Edema: mild bilaterally-and-2/7 pitting edema  Negative Homans sign, bilateral    Toenails: All of the toenails were elongated,-hypertrophied,-discolored,-shown to have subungual debris,-tender-and-Mycotic with onychauxis       Socks and shoes removed, Right Foot Findings: swollen and erythematous, but no dryness  Diminished tactile sensation with monofilament testing throughout the right foot    Socks and shoes removed, Left Foot Findings: swollen, erythematous and dry  Diminished tactile sensation with monofilament testing throughout the left foot    Capillary refills findings on the right were delayed in the toes  Pulses:   1+ in the posterior tibialis on the right   1+ in the dorsalis pedis on the right  Capillary refills findings on the left were delayed in the toes  Pulses:   1+ in the posterior tibialis on the left   1+ in the dorsalis pedis on the left  Hyperkeratosis: present on both first toes-and-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  There is eczema in his heels  There are fissures  There is no cellulitis  Shoe Gear Evaluation: performed ()     Procedure        Procedure: toenail debridement performed on all toenails   Indications for the procedure include professional performance of nail care required due to loss of sensation from diabetes  The procedure's risks were discussed with the patient  Procedure Note: The toenails were debrided using heavy-duty nail nippers-and-with a nail   Post-Procedure: the patient tolerated the procedure well  Complications: there were no complications  Bilateral plantar eczema was debrided down to normal levels  All mycotic nails debrided  Patient tolerated this well without complication her pain            Patient's shoes and socks removed  Right Foot/Ankle   Right Foot Inspection     Toe Exam: swelling  Sensory   Vibration: absent  Proprioception: absent   Monofilament testing: absent  Vascular     The right DP pulse is 1+  The right PT pulse is 1+       Left Foot/Ankle  Left Foot Inspection                 Toe Exam: swelling                   Sensory   Vibration: absent  Proprioception: absent     Vascular     The left DP pulse is 1+  The left PT pulse is 1+       Patient's shoes and socks removed  Assign Risk Category:  Deformity present;  Loss of protective sensation; Weak pulses       Risk: 2

## 2021-09-07 ENCOUNTER — OFFICE VISIT (OUTPATIENT)
Dept: PODIATRY | Facility: CLINIC | Age: 76
End: 2021-09-07
Payer: COMMERCIAL

## 2021-09-07 VITALS — WEIGHT: 310 LBS | HEIGHT: 70 IN | BODY MASS INDEX: 44.38 KG/M2 | RESPIRATION RATE: 16 BRPM

## 2021-09-07 DIAGNOSIS — B35.1 ONYCHOMYCOSIS: ICD-10-CM

## 2021-09-07 DIAGNOSIS — M79.672 PAIN IN BOTH FEET: ICD-10-CM

## 2021-09-07 DIAGNOSIS — I70.209 ATHEROSCLEROSIS OF ARTERIES OF EXTREMITIES (HCC): ICD-10-CM

## 2021-09-07 DIAGNOSIS — M79.671 PAIN IN BOTH FEET: ICD-10-CM

## 2021-09-07 DIAGNOSIS — E11.42 DIABETIC POLYNEUROPATHY ASSOCIATED WITH TYPE 2 DIABETES MELLITUS (HCC): Primary | ICD-10-CM

## 2021-09-07 PROCEDURE — 11721 DEBRIDE NAIL 6 OR MORE: CPT | Performed by: PODIATRIST

## 2021-09-07 NOTE — PROGRESS NOTES
Assessment/Plan:  Pain upon ambulation   Edema   Mycosis of nail and skin   peripheral artery disease  Diabetic neuropathy     Plan   Patient educated on condition   All abnormal tissue debrided   Nails debrided  Procedures performed without pain or complication  Patient will use OTC topical antifungal        Subjective   Patient complains of pain in his feet with ambulation   He has pain when he wears shoes   No history of trauma   Patient is diabetic           Review of Systems   Constitutional: Negative     HENT: Negative     Eyes: Negative     Respiratory: Negative     Cardiovascular: Negative     Gastrointestinal: Negative     Endocrine: Negative     Genitourinary: Negative     Musculoskeletal: Negative     Skin: Negative     Allergic/Immunologic: Negative     Neurological: Negative     Hematological: Negative     Psychiatric/Behavioral: Negative           Objective:      Physical Exam   Cardiovascular:   Pulses:       Dorsalis pedis pulses are 1+ on the right side, and 1+ on the left side         Posterior tibial pulses are 1+ on the right side, and 1+ on the left side      Discussion/Summary     Agent has increasing symptoms of neuropathy  We will add vitamin B complex  The patient was counseled regarding instructions for management,-patient and family education,-importance of compliance with treatment  Patient is able to Self-Care  The treatment plan was reviewed with the patient/guardian  The patient/guardian understands and agrees with the treatment plan      Chief Complaint  nail care      History of Present Illness  HPI: This patient a morbidly obese 27-year-old white male with diabetes mellitus, complains of pain within his feet  He has pain in the heels with walking  He has pain in in his toes with shoe wear  He has no history of trauma  He has not done any treatment area patient also is pain in his left arch   He is having a gouty attack      Active Problems  1  Acute gouty arthritis (274 01) (M10 9)   2  Atherosclerosis of arteries of extremities (440 20) (I70 209)   3  Callus (700) (L84)   4  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)   5  Diabetes mellitus with neuropathy (250 60,357 2) (E11 40)   6  Diabetic neuropathy (250 60,357 2) (E11 40)   7  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)   8  Onychomycosis (110 1) (B35 1)   9  Pes planus, unspecified laterality (734) (M21 40)   10  Tenderness in limb (729 5) (M79 609)     Social History   · Former smoker (P52 00) (Z87 891)   · Never Drank Alcohol      The medication list was reviewed and updated today       Allergies  1  No Known Drug Allergies        Physical Exam  Left Foot: Appearance: Normal except as noted: excessive pronation-and-pes planus  Right Foot: Appearance: Normal except as noted: excessive pronation-and-pes planus  Left Ankle: ROM: limited ROM in all planes Motor: diffuse weakness  Right Ankle: ROM: limited ROM in all planes Motor: diffuse weakness  Neurological Exam: performed  Light touch was absent bilaterally  Vibratory sensation was absent bilaterally  Deep tendon reflexes: achilles reflex One/4 bilaterally  Vascular Exam: performed Dorsalis pedis pulses were One/4 bilaterally  Posterior tibial pulses were One/4 bilaterally  Elevation Pallor: present bilaterally  Dependence rubor was absent bilaterally  Capillary refill time was Q  9, findings bilateral  Negative digital hair noted, but-between 1-3 seconds bilaterally  Edema: mild bilaterally-and-2/7 pitting edema  Negative Homans sign, bilateral    Toenails: All of the toenails were elongated,-hypertrophied,-discolored,-shown to have subungual debris,-tender-and-Mycotic with onychauxis       Socks and shoes removed, Right Foot Findings: swollen and erythematous, but no dryness  Diminished tactile sensation with monofilament testing throughout the right foot    Socks and shoes removed, Left Foot Findings: swollen, erythematous and dry   Diminished tactile sensation with monofilament testing throughout the left foot    Capillary refills findings on the right were delayed in the toes  Pulses:   1+ in the posterior tibialis on the right   1+ in the dorsalis pedis on the right  Capillary refills findings on the left were delayed in the toes  Pulses:   1+ in the posterior tibialis on the left   1+ in the dorsalis pedis on the left  Hyperkeratosis: present on both first toes-and-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  There is eczema in his heels  There are fissures  There is no cellulitis  Shoe Gear Evaluation: performed ()     Procedure        Procedure: toenail debridement performed on all toenails   Indications for the procedure include professional performance of nail care required due to loss of sensation from diabetes  The procedure's risks were discussed with the patient  Procedure Note: The toenails were debrided using heavy-duty nail nippers-and-with a nail   Post-Procedure: the patient tolerated the procedure well  Complications: there were no complications  Bilateral plantar eczema was debrided down to normal levels  All mycotic nails debrided  Patient tolerated this well without complication her pain            Patient's shoes and socks removed  Right Foot/Ankle   Right Foot Inspection     Toe Exam: swelling  Sensory   Vibration: absent  Proprioception: absent   Monofilament testing: absent  Vascular     The right DP pulse is 1+  The right PT pulse is 1+       Left Foot/Ankle  Left Foot Inspection                 Toe Exam: swelling                   Sensory   Vibration: absent  Proprioception: absent     Vascular     The left DP pulse is 1+  The left PT pulse is 1+       Patient's shoes and socks removed  Assign Risk Category:  Deformity present; Loss of protective sensation; Weak pulses       Risk: 2

## 2021-11-16 ENCOUNTER — OFFICE VISIT (OUTPATIENT)
Dept: PODIATRY | Facility: CLINIC | Age: 76
End: 2021-11-16
Payer: COMMERCIAL

## 2021-11-16 VITALS — BODY MASS INDEX: 44.38 KG/M2 | WEIGHT: 310 LBS | RESPIRATION RATE: 17 BRPM | HEIGHT: 70 IN

## 2021-11-16 DIAGNOSIS — M79.672 PAIN IN BOTH FEET: ICD-10-CM

## 2021-11-16 DIAGNOSIS — E11.42 DIABETIC POLYNEUROPATHY ASSOCIATED WITH TYPE 2 DIABETES MELLITUS (HCC): Primary | ICD-10-CM

## 2021-11-16 DIAGNOSIS — I70.209 ATHEROSCLEROSIS OF ARTERIES OF EXTREMITIES (HCC): ICD-10-CM

## 2021-11-16 DIAGNOSIS — M79.671 PAIN IN BOTH FEET: ICD-10-CM

## 2021-11-16 DIAGNOSIS — B35.1 ONYCHOMYCOSIS: ICD-10-CM

## 2021-11-16 PROCEDURE — 11721 DEBRIDE NAIL 6 OR MORE: CPT | Performed by: PODIATRIST

## 2022-02-09 ENCOUNTER — OFFICE VISIT (OUTPATIENT)
Dept: PODIATRY | Facility: CLINIC | Age: 77
End: 2022-02-09
Payer: COMMERCIAL

## 2022-02-09 VITALS — WEIGHT: 310 LBS | HEIGHT: 70 IN | BODY MASS INDEX: 44.38 KG/M2 | RESPIRATION RATE: 17 BRPM

## 2022-02-09 DIAGNOSIS — I70.209 ATHEROSCLEROSIS OF ARTERIES OF EXTREMITIES (HCC): ICD-10-CM

## 2022-02-09 DIAGNOSIS — B35.1 ONYCHOMYCOSIS: ICD-10-CM

## 2022-02-09 DIAGNOSIS — M79.671 PAIN IN BOTH FEET: ICD-10-CM

## 2022-02-09 DIAGNOSIS — M79.672 PAIN IN BOTH FEET: ICD-10-CM

## 2022-02-09 DIAGNOSIS — E11.42 DIABETIC POLYNEUROPATHY ASSOCIATED WITH TYPE 2 DIABETES MELLITUS (HCC): Primary | ICD-10-CM

## 2022-02-09 PROCEDURE — 11721 DEBRIDE NAIL 6 OR MORE: CPT | Performed by: PODIATRIST

## 2022-02-09 NOTE — PROGRESS NOTES
Assessment/Plan:  Pain upon ambulation   Edema   Mycosis of nail and skin   peripheral artery disease   Diabetic neuropathy     Plan   Patient educated on condition   All abnormal tissue debrided   Nails debrided   Procedures performed without pain or complication          Subjective   Patient complains of pain in his feet with ambulation   He has pain when he wears shoes   No history of trauma   Patient is diabetic           Review of Systems   Constitutional: Negative     HENT: Negative     Eyes: Negative     Respiratory: Negative     Cardiovascular: Negative     Gastrointestinal: Negative     Endocrine: Negative     Genitourinary: Negative     Musculoskeletal: Negative     Skin: Negative     Allergic/Immunologic: Negative     Neurological: Negative     Hematological: Negative     Psychiatric/Behavioral: Negative           Objective:      Physical Exam   Cardiovascular:   Pulses:       Dorsalis pedis pulses are 1+ on the right side, and 1+ on the left side         Posterior tibial pulses are 1+ on the right side, and 1+ on the left side      Discussion/Summary     Agent has increasing symptoms of neuropathy  We will add vitamin B complex  The patient was counseled regarding instructions for management,-patient and family education,-importance of compliance with treatment  Patient is able to Self-Care  The treatment plan was reviewed with the patient/guardian  The patient/guardian understands and agrees with the treatment plan      Chief Complaint  nail care      History of Present Illness  HPI: This patient a morbidly obese 31-year-old white male with diabetes mellitus, complains of pain within his feet  He has pain in the heels with walking  He has pain in in his toes with shoe wear  He has no history of trauma  He has not done any treatment area patient also is pain in his left arch   He is having a gouty attack      Active Problems  1  Acute gouty arthritis (274 01) (M10 9)   2  Atherosclerosis of arteries of extremities (440 20) (I70 209)   3  Callus (700) (L84)   4  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)   5  Diabetes mellitus with neuropathy (250 60,357 2) (E11 40)   6  Diabetic neuropathy (250 60,357 2) (E11 40)   7  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)   8  Onychomycosis (110 1) (B35 1)   9  Pes planus, unspecified laterality (734) (M21 40)   10  Tenderness in limb (729 5) (M79 609)     Social History   · Former smoker (V47 24) (Z87 891)   · Never Drank Alcohol      The medication list was reviewed and updated today       Allergies  1  No Known Drug Allergies        Physical Exam  Left Foot: Appearance: Normal except as noted: excessive pronation-and-pes planus  Right Foot: Appearance: Normal except as noted: excessive pronation-and-pes planus  Left Ankle: ROM: limited ROM in all planes Motor: diffuse weakness  Right Ankle: ROM: limited ROM in all planes Motor: diffuse weakness  Neurological Exam: performed  Light touch was absent bilaterally  Vibratory sensation was absent bilaterally  Deep tendon reflexes: achilles reflex One/4 bilaterally  Vascular Exam: performed Dorsalis pedis pulses were One/4 bilaterally  Posterior tibial pulses were One/4 bilaterally  Elevation Pallor: present bilaterally  Dependence rubor was absent bilaterally  Capillary refill time was Q  9, findings bilateral  Negative digital hair noted, but-between 1-3 seconds bilaterally  Edema: mild bilaterally-and-2/7 pitting edema  Negative Homans sign, bilateral    Toenails: All of the toenails were elongated,-hypertrophied,-discolored,-shown to have subungual debris,-tender-and-Mycotic with onychauxis       Socks and shoes removed, Right Foot Findings: swollen and erythematous, but no dryness  Diminished tactile sensation with monofilament testing throughout the right foot    Socks and shoes removed, Left Foot Findings: swollen, erythematous and dry   Diminished tactile sensation with monofilament testing throughout the left foot    Capillary refills findings on the right were delayed in the toes  Pulses:   1+ in the posterior tibialis on the right   1+ in the dorsalis pedis on the right  Capillary refills findings on the left were delayed in the toes  Pulses:   1+ in the posterior tibialis on the left   1+ in the dorsalis pedis on the left  Hyperkeratosis: present on both first toes-and-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  There is eczema in his heels  There are fissures  There is no cellulitis  Shoe Gear Evaluation: performed ()     Procedure        Procedure: toenail debridement performed on all toenails   Indications for the procedure include professional performance of nail care required due to loss of sensation from diabetes  The procedure's risks were discussed with the patient  Procedure Note: The toenails were debrided using heavy-duty nail nippers-and-with a nail   Post-Procedure: the patient tolerated the procedure well  Complications: there were no complications  Bilateral plantar eczema was debrided down to normal levels  All mycotic nails debrided  Patient tolerated this well without complication her pain            Patient's shoes and socks removed  Right Foot/Ankle   Right Foot Inspection     Toe Exam: swelling  Sensory   Vibration: absent  Proprioception: absent   Monofilament testing: absent  Vascular     The right DP pulse is 1+  The right PT pulse is 1+       Left Foot/Ankle  Left Foot Inspection                 Toe Exam: swelling                   Sensory   Vibration: absent  Proprioception: absent     Vascular     The left DP pulse is 1+  The left PT pulse is 1+      Patient's shoes and socks removed          Assign Risk Category  Deformity present  Loss of protective sensation  Weak pulses  Risk: 2

## 2022-04-13 ENCOUNTER — OFFICE VISIT (OUTPATIENT)
Dept: PODIATRY | Facility: CLINIC | Age: 77
End: 2022-04-13
Payer: COMMERCIAL

## 2022-04-13 VITALS — BODY MASS INDEX: 44.38 KG/M2 | RESPIRATION RATE: 17 BRPM | WEIGHT: 310 LBS | HEIGHT: 70 IN

## 2022-04-13 DIAGNOSIS — E11.42 DIABETIC POLYNEUROPATHY ASSOCIATED WITH TYPE 2 DIABETES MELLITUS (HCC): Primary | ICD-10-CM

## 2022-04-13 DIAGNOSIS — I70.209 ATHEROSCLEROSIS OF ARTERIES OF EXTREMITIES (HCC): ICD-10-CM

## 2022-04-13 DIAGNOSIS — B35.1 ONYCHOMYCOSIS: ICD-10-CM

## 2022-04-13 DIAGNOSIS — M79.671 PAIN IN BOTH FEET: ICD-10-CM

## 2022-04-13 DIAGNOSIS — M79.672 PAIN IN BOTH FEET: ICD-10-CM

## 2022-04-13 PROCEDURE — 11721 DEBRIDE NAIL 6 OR MORE: CPT | Performed by: PODIATRIST

## 2022-04-13 NOTE — PROGRESS NOTES
Assessment/Plan:  Pain upon ambulation   Edema   Mycosis of nail and skin   peripheral artery disease   Diabetic neuropathy     Plan   Patient educated on condition   All abnormal tissue debrided   Nails debrided   Procedures performed without pain or complication          Subjective   Patient complains of pain in his feet with ambulation   He has pain when he wears shoes   No history of trauma   Patient is diabetic           Review of Systems   Constitutional: Negative     HENT: Negative     Eyes: Negative     Respiratory: Negative     Cardiovascular: Negative     Gastrointestinal: Negative     Endocrine: Negative     Genitourinary: Negative     Musculoskeletal: Negative     Skin: Negative     Allergic/Immunologic: Negative     Neurological: Negative     Hematological: Negative     Psychiatric/Behavioral: Negative           Objective:      Physical Exam   Cardiovascular:   Pulses:       Dorsalis pedis pulses are 1+ on the right side, and 1+ on the left side         Posterior tibial pulses are 1+ on the right side, and 1+ on the left side      Discussion/Summary     Agent has increasing symptoms of neuropathy  We will add vitamin B complex  The patient was counseled regarding instructions for management,-patient and family education,-importance of compliance with treatment  Patient is able to Self-Care  The treatment plan was reviewed with the patient/guardian  The patient/guardian understands and agrees with the treatment plan      Chief Complaint  nail care      History of Present Illness  HPI: This patient a morbidly obese 80-year-old white male with diabetes mellitus, complains of pain within his feet  He has pain in the heels with walking  He has pain in in his toes with shoe wear  He has no history of trauma  He has not done any treatment area patient also is pain in his left arch   He is having a gouty attack      Active Problems  1  Acute gouty arthritis (274 01) (M10 9)   2  Atherosclerosis of arteries of extremities (440 20) (I70 209)   3  Callus (700) (L84)   4  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)   5  Diabetes mellitus with neuropathy (250 60,357 2) (E11 40)   6  Diabetic neuropathy (250 60,357 2) (E11 40)   7  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)   8  Onychomycosis (110 1) (B35 1)   9  Pes planus, unspecified laterality (734) (M21 40)   10  Tenderness in limb (729 5) (M79 609)     Social History   · Former smoker (V76 86) (Z87 891)   · Never Drank Alcohol      The medication list was reviewed and updated today       Allergies  1  No Known Drug Allergies        Physical Exam  Left Foot: Appearance: Normal except as noted: excessive pronation-and-pes planus  Right Foot: Appearance: Normal except as noted: excessive pronation-and-pes planus  Left Ankle: ROM: limited ROM in all planes Motor: diffuse weakness  Right Ankle: ROM: limited ROM in all planes Motor: diffuse weakness  Neurological Exam: performed  Light touch was absent bilaterally  Vibratory sensation was absent bilaterally  Deep tendon reflexes: achilles reflex One/4 bilaterally  Vascular Exam: performed Dorsalis pedis pulses were One/4 bilaterally  Posterior tibial pulses were One/4 bilaterally  Elevation Pallor: present bilaterally  Dependence rubor was absent bilaterally  Capillary refill time was Q  9, findings bilateral  Negative digital hair noted, but-between 1-3 seconds bilaterally  Edema: mild bilaterally-and-2/7 pitting edema  Negative Homans sign, bilateral    Toenails: All of the toenails were elongated,-hypertrophied,-discolored,-shown to have subungual debris,-tender-and-Mycotic with onychauxis       Socks and shoes removed, Right Foot Findings: swollen and erythematous, but no dryness  Diminished tactile sensation with monofilament testing throughout the right foot    Socks and shoes removed, Left Foot Findings: swollen, erythematous and dry   Diminished tactile sensation with monofilament testing throughout the left foot    Capillary refills findings on the right were delayed in the toes  Pulses:   1+ in the posterior tibialis on the right   1+ in the dorsalis pedis on the right  Capillary refills findings on the left were delayed in the toes  Pulses:   1+ in the posterior tibialis on the left   1+ in the dorsalis pedis on the left  Hyperkeratosis: present on both first toes-and-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  There is eczema in his heels  There are fissures  There is no cellulitis  Shoe Gear Evaluation: performed ()     Procedure        Procedure: toenail debridement performed on all toenails   Indications for the procedure include professional performance of nail care required due to loss of sensation from diabetes  The procedure's risks were discussed with the patient  Procedure Note: The toenails were debrided using heavy-duty nail nippers-and-with a nail   Post-Procedure: the patient tolerated the procedure well  Complications: there were no complications  Bilateral plantar eczema was debrided down to normal levels  All mycotic nails debrided  Patient tolerated this well without complication her pain            Patient's shoes and socks removed  Right Foot/Ankle   Right Foot Inspection     Toe Exam: swelling  Sensory   Vibration: absent  Proprioception: absent   Monofilament testing: absent  Vascular     The right DP pulse is 1+  The right PT pulse is 1+       Left Foot/Ankle  Left Foot Inspection                 Toe Exam: swelling                   Sensory   Vibration: absent  Proprioception: absent     Vascular     The left DP pulse is 1+  The left PT pulse is 1+       Patient's shoes and socks removed          Assign Risk Category  Deformity present  Loss of protective sensation  Weak pulses  Risk: 2

## 2022-06-15 ENCOUNTER — OFFICE VISIT (OUTPATIENT)
Dept: PODIATRY | Facility: CLINIC | Age: 77
End: 2022-06-15
Payer: COMMERCIAL

## 2022-06-15 VITALS — WEIGHT: 310 LBS | RESPIRATION RATE: 17 BRPM | HEIGHT: 70 IN | BODY MASS INDEX: 44.38 KG/M2

## 2022-06-15 DIAGNOSIS — M79.672 PAIN IN BOTH FEET: ICD-10-CM

## 2022-06-15 DIAGNOSIS — I70.209 ATHEROSCLEROSIS OF ARTERIES OF EXTREMITIES (HCC): ICD-10-CM

## 2022-06-15 DIAGNOSIS — M79.671 PAIN IN BOTH FEET: ICD-10-CM

## 2022-06-15 DIAGNOSIS — B35.1 ONYCHOMYCOSIS: ICD-10-CM

## 2022-06-15 DIAGNOSIS — E11.42 DIABETIC POLYNEUROPATHY ASSOCIATED WITH TYPE 2 DIABETES MELLITUS (HCC): Primary | ICD-10-CM

## 2022-06-15 PROCEDURE — 11721 DEBRIDE NAIL 6 OR MORE: CPT | Performed by: PODIATRIST

## 2022-06-15 NOTE — PROGRESS NOTES
Assessment/Plan:  Pain upon ambulation   Edema   Mycosis of nail and skin   peripheral artery disease   Diabetic neuropathy     Plan   Patient educated on condition   All abnormal tissue debrided   Nails debrided   Procedures performed without pain or complication          Subjective   Patient complains of pain in his feet with ambulation   He has pain when he wears shoes   No history of trauma   Patient is diabetic           Review of Systems   Constitutional: Negative     HENT: Negative     Eyes: Negative     Respiratory: Negative     Cardiovascular: Negative     Gastrointestinal: Negative     Endocrine: Negative     Genitourinary: Negative     Musculoskeletal: Negative     Skin: Negative     Allergic/Immunologic: Negative     Neurological: Negative     Hematological: Negative     Psychiatric/Behavioral: Negative           Objective:      Physical Exam   Cardiovascular:   Pulses:       Dorsalis pedis pulses are 1+ on the right side, and 1+ on the left side         Posterior tibial pulses are 1+ on the right side, and 1+ on the left side      Discussion/Summary     Agent has increasing symptoms of neuropathy  We will add vitamin B complex  The patient was counseled regarding instructions for management,-patient and family education,-importance of compliance with treatment  Patient is able to Self-Care  The treatment plan was reviewed with the patient/guardian  The patient/guardian understands and agrees with the treatment plan      Chief Complaint  nail care      History of Present Illness  HPI: This patient a morbidly obese 69-year-old white male with diabetes mellitus, complains of pain within his feet  He has pain in the heels with walking  He has pain in in his toes with shoe wear  He has no history of trauma  He has not done any treatment area patient also is pain in his left arch   He is having a gouty attack      Active Problems  1  Acute gouty arthritis (274 01) (M10 9)   2  Atherosclerosis of arteries of extremities (440 20) (I70 209)   3  Callus (700) (L84)   4  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)   5  Diabetes mellitus with neuropathy (250 60,357 2) (E11 40)   6  Diabetic neuropathy (250 60,357 2) (E11 40)   7  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)   8  Onychomycosis (110 1) (B35 1)   9  Pes planus, unspecified laterality (734) (M21 40)   10  Tenderness in limb (729 5) (M79 609)     Social History   · Former smoker (V35 20) (Z87 891)   · Never Drank Alcohol      The medication list was reviewed and updated today       Allergies  1  No Known Drug Allergies        Physical Exam  Left Foot: Appearance: Normal except as noted: excessive pronation-and-pes planus  Right Foot: Appearance: Normal except as noted: excessive pronation-and-pes planus  Left Ankle: ROM: limited ROM in all planes Motor: diffuse weakness  Right Ankle: ROM: limited ROM in all planes Motor: diffuse weakness  Neurological Exam: performed  Light touch was absent bilaterally  Vibratory sensation was absent bilaterally  Deep tendon reflexes: achilles reflex One/4 bilaterally  Vascular Exam: performed Dorsalis pedis pulses were One/4 bilaterally  Posterior tibial pulses were One/4 bilaterally  Elevation Pallor: present bilaterally  Dependence rubor was absent bilaterally  Capillary refill time was Q  9, findings bilateral  Negative digital hair noted, but-between 1-3 seconds bilaterally  Edema: mild bilaterally-and-2/7 pitting edema  Negative Homans sign, bilateral    Toenails: All of the toenails were elongated,-hypertrophied,-discolored,-shown to have subungual debris,-tender-and-Mycotic with onychauxis       Socks and shoes removed, Right Foot Findings: swollen and erythematous, but no dryness  Diminished tactile sensation with monofilament testing throughout the right foot    Socks and shoes removed, Left Foot Findings: swollen, erythematous and dry   Diminished tactile sensation with monofilament testing throughout the left foot    Capillary refills findings on the right were delayed in the toes  Pulses:   1+ in the posterior tibialis on the right   1+ in the dorsalis pedis on the right  Capillary refills findings on the left were delayed in the toes  Pulses:   1+ in the posterior tibialis on the left   1+ in the dorsalis pedis on the left  Hyperkeratosis: present on both first toes-and-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  There is eczema in his heels  There are fissures  There is no cellulitis  Shoe Gear Evaluation: performed ()     Procedure        Procedure: toenail debridement performed on all toenails   Indications for the procedure include professional performance of nail care required due to loss of sensation from diabetes  The procedure's risks were discussed with the patient  Procedure Note: The toenails were debrided using heavy-duty nail nippers-and-with a nail   Post-Procedure: the patient tolerated the procedure well  Complications: there were no complications  Bilateral plantar eczema was debrided down to normal levels  All mycotic nails debrided  Patient tolerated this well without complication her pain            Patient's shoes and socks removed  Right Foot/Ankle   Right Foot Inspection     Toe Exam: swelling  Sensory   Vibration: absent  Proprioception: absent   Monofilament testing: absent  Vascular     The right DP pulse is 1+  The right PT pulse is 1+       Left Foot/Ankle  Left Foot Inspection                 Toe Exam: swelling                   Sensory   Vibration: absent  Proprioception: absent     Vascular     The left DP pulse is 1+   The left PT pulse is 1+       Patient's shoes and socks removed            Assign Risk Category  Deformity present  Loss of protective sensation  Weak pulses  Risk: 2

## 2022-08-17 ENCOUNTER — OFFICE VISIT (OUTPATIENT)
Dept: PODIATRY | Facility: CLINIC | Age: 77
End: 2022-08-17
Payer: COMMERCIAL

## 2022-08-17 VITALS — WEIGHT: 310 LBS | RESPIRATION RATE: 17 BRPM | HEIGHT: 70 IN | BODY MASS INDEX: 44.38 KG/M2

## 2022-08-17 DIAGNOSIS — E11.42 DIABETIC POLYNEUROPATHY ASSOCIATED WITH TYPE 2 DIABETES MELLITUS (HCC): Primary | ICD-10-CM

## 2022-08-17 DIAGNOSIS — M79.672 PAIN IN BOTH FEET: ICD-10-CM

## 2022-08-17 DIAGNOSIS — M79.671 PAIN IN BOTH FEET: ICD-10-CM

## 2022-08-17 DIAGNOSIS — I70.209 ATHEROSCLEROSIS OF ARTERIES OF EXTREMITIES (HCC): ICD-10-CM

## 2022-08-17 DIAGNOSIS — B35.1 ONYCHOMYCOSIS: ICD-10-CM

## 2022-08-17 PROCEDURE — 11721 DEBRIDE NAIL 6 OR MORE: CPT | Performed by: PODIATRIST

## 2022-08-17 NOTE — PROGRESS NOTES
Assessment/Plan:  Pain upon ambulation   Edema   Mycosis of nail and skin   peripheral artery disease   Diabetic neuropathy     Plan   Patient educated on condition   All abnormal tissue debrided   Nails debrided   Procedures performed without pain or complication          Subjective   Patient complains of pain in his feet with ambulation   He has pain when he wears shoes   No history of trauma   Patient is diabetic           Review of Systems   Constitutional: Negative     HENT: Negative     Eyes: Negative     Respiratory: Negative     Cardiovascular: Negative     Gastrointestinal: Negative     Endocrine: Negative     Genitourinary: Negative     Musculoskeletal: Negative     Skin: Negative     Allergic/Immunologic: Negative     Neurological: Negative     Hematological: Negative     Psychiatric/Behavioral: Negative           Objective:      Physical Exam   Cardiovascular:   Pulses:       Dorsalis pedis pulses are 1+ on the right side, and 1+ on the left side         Posterior tibial pulses are 1+ on the right side, and 1+ on the left side      Discussion/Summary     Agent has increasing symptoms of neuropathy  We will add vitamin B complex  The patient was counseled regarding instructions for management,-patient and family education,-importance of compliance with treatment  Patient is able to Self-Care  The treatment plan was reviewed with the patient/guardian  The patient/guardian understands and agrees with the treatment plan      Chief Complaint  nail care      History of Present Illness  HPI: This patient a morbidly obese 79-year-old white male with diabetes mellitus, complains of pain within his feet  He has pain in the heels with walking  He has pain in in his toes with shoe wear  He has no history of trauma  He has not done any treatment area patient also is pain in his left arch   He is having a gouty attack      Active Problems  1  Acute gouty arthritis (274 01) (M10 9)   2  Atherosclerosis of arteries of extremities (440 20) (I70 209)   3  Callus (700) (L84)   4  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)   5  Diabetes mellitus with neuropathy (250 60,357 2) (E11 40)   6  Diabetic neuropathy (250 60,357 2) (E11 40)   7  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)   8  Onychomycosis (110 1) (B35 1)   9  Pes planus, unspecified laterality (734) (M21 40)   10  Tenderness in limb (729 5) (M79 609)     Social History   · Former smoker (N88 33) (Z87 891)   · Never Drank Alcohol      The medication list was reviewed and updated today       Allergies  1  No Known Drug Allergies        Physical Exam  Left Foot: Appearance: Normal except as noted: excessive pronation-and-pes planus  Right Foot: Appearance: Normal except as noted: excessive pronation-and-pes planus  Left Ankle: ROM: limited ROM in all planes Motor: diffuse weakness  Right Ankle: ROM: limited ROM in all planes Motor: diffuse weakness  Neurological Exam: performed  Light touch was absent bilaterally  Vibratory sensation was absent bilaterally  Deep tendon reflexes: achilles reflex One/4 bilaterally  Vascular Exam: performed Dorsalis pedis pulses were One/4 bilaterally  Posterior tibial pulses were One/4 bilaterally  Elevation Pallor: present bilaterally  Dependence rubor was absent bilaterally  Capillary refill time was Q  9, findings bilateral  Negative digital hair noted, but-between 1-3 seconds bilaterally  Edema: mild bilaterally-and-2/7 pitting edema  Negative Homans sign, bilateral    Toenails: All of the toenails were elongated,-hypertrophied,-discolored,-shown to have subungual debris,-tender-and-Mycotic with onychauxis       Socks and shoes removed, Right Foot Findings: swollen and erythematous, but no dryness  Diminished tactile sensation with monofilament testing throughout the right foot    Socks and shoes removed, Left Foot Findings: swollen, erythematous and dry   Diminished tactile sensation with monofilament testing throughout the left foot    Capillary refills findings on the right were delayed in the toes  Pulses:   1+ in the posterior tibialis on the right   1+ in the dorsalis pedis on the right  Capillary refills findings on the left were delayed in the toes  Pulses:   1+ in the posterior tibialis on the left   1+ in the dorsalis pedis on the left  Hyperkeratosis: present on both first toes-and-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  There is eczema in his heels  There are fissures  There is no cellulitis  Shoe Gear Evaluation: performed ()     Procedure        Procedure: toenail debridement performed on all toenails   Indications for the procedure include professional performance of nail care required due to loss of sensation from diabetes  The procedure's risks were discussed with the patient  Procedure Note: The toenails were debrided using heavy-duty nail nippers-and-with a nail   Post-Procedure: the patient tolerated the procedure well  Complications: there were no complications  Bilateral plantar eczema was debrided down to normal levels  All mycotic nails debrided  Patient tolerated this well without complication her pain            Patient's shoes and socks removed  Right Foot/Ankle   Right Foot Inspection     Toe Exam: swelling  Sensory   Vibration: absent  Proprioception: absent   Monofilament testing: absent  Vascular     The right DP pulse is 1+  The right PT pulse is 1+       Left Foot/Ankle  Left Foot Inspection                 Toe Exam: swelling                   Sensory   Vibration: absent  Proprioception: absent     Vascular     The left DP pulse is 1+   The left PT pulse is 1+       Patient's shoes and socks removed            Assign Risk Category  Deformity present  Loss of protective sensation  Weak pulses  Risk: 2

## 2022-10-27 ENCOUNTER — OFFICE VISIT (OUTPATIENT)
Dept: PODIATRY | Facility: CLINIC | Age: 77
End: 2022-10-27
Payer: COMMERCIAL

## 2022-10-27 VITALS — RESPIRATION RATE: 17 BRPM | WEIGHT: 310 LBS | HEIGHT: 70 IN | BODY MASS INDEX: 44.38 KG/M2

## 2022-10-27 DIAGNOSIS — B35.1 ONYCHOMYCOSIS: ICD-10-CM

## 2022-10-27 DIAGNOSIS — M79.672 PAIN IN BOTH FEET: ICD-10-CM

## 2022-10-27 DIAGNOSIS — E11.42 DIABETIC POLYNEUROPATHY ASSOCIATED WITH TYPE 2 DIABETES MELLITUS (HCC): Primary | ICD-10-CM

## 2022-10-27 DIAGNOSIS — M79.671 PAIN IN BOTH FEET: ICD-10-CM

## 2022-10-27 DIAGNOSIS — I70.209 ATHEROSCLEROSIS OF ARTERIES OF EXTREMITIES (HCC): ICD-10-CM

## 2022-10-27 PROCEDURE — 11721 DEBRIDE NAIL 6 OR MORE: CPT | Performed by: PODIATRIST

## 2022-10-27 NOTE — PROGRESS NOTES
Assessment/Plan:  Pain upon ambulation   Edema   Mycosis of nail and skin   peripheral artery disease   Diabetic neuropathy     Plan   Patient educated on condition   All abnormal tissue debrided   Nails debrided   Procedures performed without pain or complication          Subjective   Patient complains of pain in his feet with ambulation   He has pain when he wears shoes   No history of trauma   Patient is diabetic           Review of Systems   Constitutional: Negative     HENT: Negative     Eyes: Negative     Respiratory: Negative     Cardiovascular: Negative     Gastrointestinal: Negative     Endocrine: Negative     Genitourinary: Negative     Musculoskeletal: Negative     Skin: Negative     Allergic/Immunologic: Negative     Neurological: Negative     Hematological: Negative     Psychiatric/Behavioral: Negative           Objective:      Physical Exam   Cardiovascular:   Pulses:       Dorsalis pedis pulses are 1+ on the right side, and 1+ on the left side         Posterior tibial pulses are 1+ on the right side, and 1+ on the left side      Discussion/Summary     Agent has increasing symptoms of neuropathy  We will add vitamin B complex  The patient was counseled regarding instructions for management,-patient and family education,-importance of compliance with treatment  Patient is able to Self-Care  The treatment plan was reviewed with the patient/guardian  The patient/guardian understands and agrees with the treatment plan      Chief Complaint  nail care      History of Present Illness  HPI: This patient a morbidly obese 61-year-old white male with diabetes mellitus, complains of pain within his feet  He has pain in the heels with walking  He has pain in in his toes with shoe wear  He has no history of trauma  He has not done any treatment area patient also is pain in his left arch   He is having a gouty attack      Active Problems  1  Acute gouty arthritis (274 01) (M10 9)   2  Atherosclerosis of arteries of extremities (440 20) (I70 209)   3  Callus (700) (L84)   4  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)   5  Diabetes mellitus with neuropathy (250 60,357 2) (E11 40)   6  Diabetic neuropathy (250 60,357 2) (E11 40)   7  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)   8  Onychomycosis (110 1) (B35 1)   9  Pes planus, unspecified laterality (734) (M21 40)   10  Tenderness in limb (729 5) (M79 609)     Social History   · Former smoker (C86 99) (Z87 891)   · Never Drank Alcohol      The medication list was reviewed and updated today       Allergies  1  No Known Drug Allergies        Physical Exam  Left Foot: Appearance: Normal except as noted: excessive pronation-and-pes planus  Right Foot: Appearance: Normal except as noted: excessive pronation-and-pes planus  Left Ankle: ROM: limited ROM in all planes Motor: diffuse weakness  Right Ankle: ROM: limited ROM in all planes Motor: diffuse weakness  Neurological Exam: performed  Light touch was absent bilaterally  Vibratory sensation was absent bilaterally  Deep tendon reflexes: achilles reflex One/4 bilaterally  Vascular Exam: performed Dorsalis pedis pulses were One/4 bilaterally  Posterior tibial pulses were One/4 bilaterally  Elevation Pallor: present bilaterally  Dependence rubor was absent bilaterally  Capillary refill time was Q  9, findings bilateral  Negative digital hair noted, but-between 1-3 seconds bilaterally  Edema: mild bilaterally-and-2/7 pitting edema  Negative Homans sign, bilateral    Toenails: All of the toenails were elongated,-hypertrophied,-discolored,-shown to have subungual debris,-tender-and-Mycotic with onychauxis       Socks and shoes removed, Right Foot Findings: swollen and erythematous, but no dryness  Diminished tactile sensation with monofilament testing throughout the right foot    Socks and shoes removed, Left Foot Findings: swollen, erythematous and dry   Diminished tactile sensation with monofilament testing throughout the left foot    Capillary refills findings on the right were delayed in the toes  Pulses:   1+ in the posterior tibialis on the right   1+ in the dorsalis pedis on the right  Capillary refills findings on the left were delayed in the toes  Pulses:   1+ in the posterior tibialis on the left   1+ in the dorsalis pedis on the left  Hyperkeratosis: present on both first toes-and-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  There is eczema in his heels  There are fissures  There is no cellulitis  Shoe Gear Evaluation: performed ()     Procedure        Procedure: toenail debridement performed on all toenails   Indications for the procedure include professional performance of nail care required due to loss of sensation from diabetes  The procedure's risks were discussed with the patient  Procedure Note: The toenails were debrided using heavy-duty nail nippers-and-with a nail   Post-Procedure: the patient tolerated the procedure well  Complications: there were no complications  Bilateral plantar eczema was debrided down to normal levels  All mycotic nails debrided  Patient tolerated this well without complication her pain            Patient's shoes and socks removed  Right Foot/Ankle   Right Foot Inspection     Toe Exam: swelling  Sensory   Vibration: absent  Proprioception: absent   Monofilament testing: absent  Vascular     The right DP pulse is 1+  The right PT pulse is 1+       Left Foot/Ankle  Left Foot Inspection                 Toe Exam: swelling                   Sensory   Vibration: absent  Proprioception: absent     Vascular     The left DP pulse is 1+   The left PT pulse is 1+       Patient's shoes and socks removed            Assign Risk Category  Deformity present  Loss of protective sensation  Weak pulses  Risk: 2

## 2023-02-03 ENCOUNTER — OFFICE VISIT (OUTPATIENT)
Dept: PODIATRY | Facility: CLINIC | Age: 78
End: 2023-02-03

## 2023-02-03 VITALS — BODY MASS INDEX: 44.38 KG/M2 | HEIGHT: 70 IN | RESPIRATION RATE: 17 BRPM | WEIGHT: 310 LBS

## 2023-02-03 DIAGNOSIS — B36.9 DERMATOMYCOSIS: ICD-10-CM

## 2023-02-03 DIAGNOSIS — M79.672 PAIN IN BOTH FEET: ICD-10-CM

## 2023-02-03 DIAGNOSIS — E11.42 DIABETIC POLYNEUROPATHY ASSOCIATED WITH TYPE 2 DIABETES MELLITUS (HCC): Primary | ICD-10-CM

## 2023-02-03 DIAGNOSIS — M79.671 PAIN IN BOTH FEET: ICD-10-CM

## 2023-02-03 DIAGNOSIS — I70.209 ATHEROSCLEROSIS OF ARTERIES OF EXTREMITIES (HCC): ICD-10-CM

## 2023-02-03 DIAGNOSIS — B35.1 ONYCHOMYCOSIS: ICD-10-CM

## 2023-02-03 NOTE — PROGRESS NOTES
Assessment/Plan:  Pain upon ambulation   Edema   Mycosis of nail and skin   peripheral artery disease   Diabetic neuropathy     Plan   Patient educated on condition   All abnormal tissue debrided   Nails debrided   Procedures performed without pain or complication          Subjective   Patient complains of pain in his feet with ambulation   He has pain when he wears shoes   No history of trauma   Patient is diabetic           Review of Systems   Constitutional: Negative     HENT: Negative     Eyes: Negative     Respiratory: Negative     Cardiovascular: Negative     Gastrointestinal: Negative     Endocrine: Negative     Genitourinary: Negative     Musculoskeletal: Negative     Skin: Negative     Allergic/Immunologic: Negative     Neurological: Negative     Hematological: Negative     Psychiatric/Behavioral: Negative           Objective:      Physical Exam   Cardiovascular:   Pulses:       Dorsalis pedis pulses are 1+ on the right side, and 1+ on the left side         Posterior tibial pulses are 1+ on the right side, and 1+ on the left side      Discussion/Summary     Agent has increasing symptoms of neuropathy  We will add vitamin B complex  The patient was counseled regarding instructions for management,-patient and family education,-importance of compliance with treatment  Patient is able to Self-Care  The treatment plan was reviewed with the patient/guardian  The patient/guardian understands and agrees with the treatment plan      Chief Complaint  nail care      History of Present Illness  HPI: This patient a morbidly obese 70-year-old white male with diabetes mellitus, complains of pain within his feet  He has pain in the heels with walking  He has pain in in his toes with shoe wear  He has no history of trauma  He has not done any treatment area patient also is pain in his left arch   He is having a gouty attack      Active Problems  1  Acute gouty arthritis (274 01) (M10 9)   2  Atherosclerosis of arteries of extremities (440 20) (I70 209)   3  Callus (700) (L84)   4  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)   5  Diabetes mellitus with neuropathy (250 60,357 2) (E11 40)   6  Diabetic neuropathy (250 60,357 2) (E11 40)   7  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)   8  Onychomycosis (110 1) (B35 1)   9  Pes planus, unspecified laterality (734) (M21 40)   10  Tenderness in limb (729 5) (M79 609)     Social History   · Former smoker (U93 45) (Z87 891)   · Never Drank Alcohol      The medication list was reviewed and updated today       Allergies  1  No Known Drug Allergies        Physical Exam  Left Foot: Appearance: Normal except as noted: excessive pronation-and-pes planus  Right Foot: Appearance: Normal except as noted: excessive pronation-and-pes planus  Left Ankle: ROM: limited ROM in all planes Motor: diffuse weakness  Right Ankle: ROM: limited ROM in all planes Motor: diffuse weakness  Neurological Exam: performed  Light touch was absent bilaterally  Vibratory sensation was absent bilaterally  Deep tendon reflexes: achilles reflex One/4 bilaterally  Vascular Exam: performed Dorsalis pedis pulses were One/4 bilaterally  Posterior tibial pulses were One/4 bilaterally  Elevation Pallor: present bilaterally  Dependence rubor was absent bilaterally  Capillary refill time was Q  9, findings bilateral  Negative digital hair noted, but-between 1-3 seconds bilaterally  Edema: mild bilaterally-and-2/7 pitting edema  Negative Homans sign, bilateral    Toenails: All of the toenails were elongated,-hypertrophied,-discolored,-shown to have subungual debris,-tender-and-Mycotic with onychauxis       Socks and shoes removed, Right Foot Findings: swollen and erythematous, but no dryness  Diminished tactile sensation with monofilament testing throughout the right foot    Socks and shoes removed, Left Foot Findings: swollen, erythematous and dry   Diminished tactile sensation with monofilament testing throughout the left foot    Capillary refills findings on the right were delayed in the toes  Pulses:   1+ in the posterior tibialis on the right   1+ in the dorsalis pedis on the right  Capillary refills findings on the left were delayed in the toes  Pulses:   1+ in the posterior tibialis on the left   1+ in the dorsalis pedis on the left  Hyperkeratosis: present on both first toes-and-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  There is eczema in his heels  There are fissures  There is no cellulitis  Shoe Gear Evaluation: performed ()     Procedure        Procedure: toenail debridement performed on all toenails   Indications for the procedure include professional performance of nail care required due to loss of sensation from diabetes  The procedure's risks were discussed with the patient  Procedure Note: The toenails were debrided using heavy-duty nail nippers-and-with a nail   Post-Procedure: the patient tolerated the procedure well  Complications: there were no complications  Bilateral plantar eczema was debrided down to normal levels  All mycotic nails debrided  Patient tolerated this well without complication her pain            Patient's shoes and socks removed  Right Foot/Ankle   Right Foot Inspection     Toe Exam: swelling  Sensory   Vibration: absent  Proprioception: absent   Monofilament testing: absent  Vascular     The right DP pulse is 1+  The right PT pulse is 1+       Left Foot/Ankle  Left Foot Inspection                 Toe Exam: swelling                   Sensory   Vibration: absent  Proprioception: absent     Vascular     The left DP pulse is 1+  The left PT pulse is 1+     Patient's shoes and socks removed      Right Foot/Ankle   Right Foot Inspection      Sensory   Vibration: diminished      Vascular  Capillary refills: < 3 seconds          Left Foot/Ankle  Left Foot Inspection      Sensory   Vibration: diminished      Vascular  Capillary refills: < 3 seconds        Assign Risk Category  Deformity present  Loss of protective sensation  Weak pulses  Risk: 2

## 2023-07-11 ENCOUNTER — OFFICE VISIT (OUTPATIENT)
Dept: PODIATRY | Facility: CLINIC | Age: 78
End: 2023-07-11
Payer: COMMERCIAL

## 2023-07-11 VITALS — RESPIRATION RATE: 16 BRPM | HEIGHT: 70 IN | BODY MASS INDEX: 44.38 KG/M2 | WEIGHT: 310 LBS

## 2023-07-11 DIAGNOSIS — M79.672 PAIN IN BOTH FEET: ICD-10-CM

## 2023-07-11 DIAGNOSIS — M79.671 PAIN IN BOTH FEET: ICD-10-CM

## 2023-07-11 DIAGNOSIS — E11.42 DIABETIC POLYNEUROPATHY ASSOCIATED WITH TYPE 2 DIABETES MELLITUS (HCC): Primary | ICD-10-CM

## 2023-07-11 DIAGNOSIS — B35.1 ONYCHOMYCOSIS: ICD-10-CM

## 2023-07-11 DIAGNOSIS — I70.209 ATHEROSCLEROSIS OF ARTERIES OF EXTREMITIES (HCC): ICD-10-CM

## 2023-07-11 DIAGNOSIS — B36.9 DERMATOMYCOSIS: ICD-10-CM

## 2023-07-11 PROCEDURE — 99212 OFFICE O/P EST SF 10 MIN: CPT | Performed by: PODIATRIST

## 2023-07-11 NOTE — PROGRESS NOTES
Assessment/Plan:  Pain upon ambulation.  Edema.  Mycosis of nail and skin.  peripheral artery disease.  Diabetic neuropathy     Plan. Chart reviewed. Diabetic foot exam performed.  Patient educated on condition.  All abnormal tissue debrided.  Nails debrided.  Procedures performed without pain or complication.         Subjective.  Patient complains of pain in his feet with ambulation.  He has pain when he wears shoes.  No history of trauma.  Patient is diabetic.          Review of Systems   Constitutional: Negative.    HENT: Negative.    Eyes: Negative.    Respiratory: Negative.    Cardiovascular: Negative.    Gastrointestinal: Negative.    Endocrine: Negative.    Genitourinary: Negative.    Musculoskeletal: Negative.    Skin: Negative.    Allergic/Immunologic: Negative.    Neurological: Negative.    Hematological: Negative.    Psychiatric/Behavioral: Negative.          Objective:      Physical Exam   Cardiovascular:   Pulses:       Dorsalis pedis pulses are 1+ on the right side, and 1+ on the left side.        Posterior tibial pulses are 1+ on the right side, and 1+ on the left side.     Discussion/Summary     Agent has increasing symptoms of neuropathy. We will add vitamin B complex. The patient was counseled regarding instructions for management,-patient and family education,-importance of compliance with treatment. Patient is able to Self-Care. The treatment plan was reviewed with the patient/guardian. The patient/guardian understands and agrees with the treatment plan      Chief Complaint  nail care      History of Present Illness  HPI: This patient a morbidly obese 71-year-old white male with diabetes mellitus, complains of pain within his feet. He has pain in the heels with walking. He has pain in in his toes with shoe wear. He has no history of trauma. He has not done any treatment area patient also is pain in his left arch.  He is having a gouty attack      Active Problems  1. Acute gouty arthritis (274.01) (M10.9)   2. Atherosclerosis of arteries of extremities (440.20) (I70.209)   3. Callus (700) (L84)   4. 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 Diabetes mellitus with neuropathy (250.60,357.2) (E11.40)   6. Diabetic neuropathy (250.60,357.2) (E11.40)   7. 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 Onychomycosis (110.1) (B35.1)   9. Pes planus, unspecified laterality (734) (M21.40)   10. Tenderness in limb (729.5) (M79.609)     Social History   · Former smoker (D75.68) (Z87.891)   · Never Drank Alcohol      The medication list was reviewed and updated today.      Allergies  1. No Known Drug Allergies        Physical Exam  Left Foot: Appearance: Normal except as noted: excessive pronation-and-pes planus. Right Foot: Appearance: Normal except as noted: excessive pronation-and-pes planus. Left Ankle: ROM: limited ROM in all planes Motor: diffuse weakness. Right Ankle: ROM: limited ROM in all planes Motor: diffuse weakness. Neurological Exam: performed. Light touch was absent bilaterally. Vibratory sensation was absent bilaterally. Deep tendon reflexes: achilles reflex One/4 bilaterally. Vascular Exam: performed Dorsalis pedis pulses were One/4 bilaterally. Posterior tibial pulses were One/4 bilaterally. Elevation Pallor: present bilaterally. Dependence rubor was absent bilaterally. Capillary refill time was Q. 9, findings bilateral. Negative digital hair noted, but-between 1-3 seconds bilaterally. Edema: mild bilaterally-and-2/7 pitting edema. Negative Homans sign, bilateral.   Toenails: All of the toenails were elongated,-hypertrophied,-discolored,-shown to have subungual debris,-tender-and-Mycotic with onychauxis.      Socks and shoes removed, Right Foot Findings: swollen and erythematous, but no dryness. Diminished tactile sensation with monofilament testing throughout the right foot.   Socks and shoes removed, Left Foot Findings: swollen, erythematous and dry.  Diminished tactile sensation with monofilament testing throughout the left foot.   Capillary refills findings on the right were delayed in the toes. Pulses:   1+ in the posterior tibialis on the right   1+ in the dorsalis pedis on the right. Capillary refills findings on the left were delayed in the toes. Pulses:   1+ in the posterior tibialis on the left   1+ in the dorsalis pedis on the left. 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 There is eczema in his heels. There are fissures. There is no cellulitis. Shoe Gear Evaluation: performed ().    Procedure        Procedure: toenail debridement performed on all toenails . Indications for the procedure include professional performance of nail care required due to loss of sensation from diabetes. The procedure's risks were discussed with the patient. Procedure Note: The toenails were debrided using heavy-duty nail nippers-and-with a nail . Post-Procedure: the patient tolerated the procedure well. Complications: there were no complications. Bilateral plantar eczema was debrided down to normal levels. All mycotic nails debrided. Patient tolerated this well without complication her pain            Patient's shoes and socks removed. Right Foot/Ankle   Right Foot Inspection     Toe Exam: swelling  Sensory   Vibration: absent  Proprioception: absent   Monofilament testing: absent  Vascular     The right DP pulse is 1+. The right PT pulse is 1+.      Left Foot/Ankle  Left Foot Inspection                 Toe Exam: swelling                   Sensory   Vibration: absent  Proprioception: absent     Vascular     The left DP pulse is 1+.  The left PT pulse is 1+.    Patient's shoes and socks removed.     Right Foot/Ankle   Right Foot Inspection        Sensory   Vibration: diminished      Vascular  Capillary refills: < 3 seconds              Left Foot/Ankle  Left Foot Inspection        Sensory   Vibration: diminished      Vascular  Capillary refills: < 3 seconds           Assign Risk Category  Deformity present  Loss of protective sensation  Weak pulses  Risk: 2

## 2023-09-19 ENCOUNTER — OFFICE VISIT (OUTPATIENT)
Dept: PODIATRY | Facility: CLINIC | Age: 78
End: 2023-09-19
Payer: COMMERCIAL

## 2023-09-19 VITALS — WEIGHT: 310 LBS | BODY MASS INDEX: 44.38 KG/M2 | RESPIRATION RATE: 17 BRPM | HEIGHT: 70 IN

## 2023-09-19 DIAGNOSIS — M79.672 PAIN IN BOTH FEET: ICD-10-CM

## 2023-09-19 DIAGNOSIS — M79.671 PAIN IN BOTH FEET: ICD-10-CM

## 2023-09-19 DIAGNOSIS — B36.9 DERMATOMYCOSIS: ICD-10-CM

## 2023-09-19 DIAGNOSIS — I70.209 ATHEROSCLEROSIS OF ARTERIES OF EXTREMITIES (HCC): ICD-10-CM

## 2023-09-19 DIAGNOSIS — B35.1 ONYCHOMYCOSIS: ICD-10-CM

## 2023-09-19 DIAGNOSIS — E11.42 DIABETIC POLYNEUROPATHY ASSOCIATED WITH TYPE 2 DIABETES MELLITUS (HCC): Primary | ICD-10-CM

## 2023-09-19 PROCEDURE — 99212 OFFICE O/P EST SF 10 MIN: CPT | Performed by: PODIATRIST

## 2023-09-19 NOTE — PROGRESS NOTES
Assessment/Plan:  Pain upon ambulation.  Edema.  Mycosis of nail and skin.  peripheral artery disease.  Diabetic neuropathy     Plan. Chart reviewed. Diabetic foot exam performed.  Patient educated on condition.  All abnormal tissue debrided.  Nails debrided.  Procedures performed without pain or complication.         Subjective.  Patient complains of pain in his feet with ambulation.  He has pain when he wears shoes.  No history of trauma.  Patient is diabetic.          Review of Systems   Constitutional: Negative.    HENT: Negative.    Eyes: Negative.    Respiratory: Negative.    Cardiovascular: Negative.    Gastrointestinal: Negative.    Endocrine: Negative.    Genitourinary: Negative.    Musculoskeletal: Negative.    Skin: Negative.    Allergic/Immunologic: Negative.    Neurological: Negative.    Hematological: Negative.    Psychiatric/Behavioral: Negative.          Objective:      Physical Exam   Cardiovascular:   Pulses:       Dorsalis pedis pulses are 1+ on the right side, and 1+ on the left side.        Posterior tibial pulses are 1+ on the right side, and 1+ on the left side.     Discussion/Summary     Agent has increasing symptoms of neuropathy. We will add vitamin B complex. The patient was counseled regarding instructions for management,-patient and family education,-importance of compliance with treatment. Patient is able to Self-Care. The treatment plan was reviewed with the patient/guardian. The patient/guardian understands and agrees with the treatment plan      Chief Complaint  nail care      History of Present Illness  HPI: This patient a morbidly obese 70-year-old white male with diabetes mellitus, complains of pain within his feet. He has pain in the heels with walking. He has pain in in his toes with shoe wear. He has no history of trauma. He has not done any treatment area patient also is pain in his left arch.  He is having a gouty attack      Active Problems  1. Acute gouty arthritis (274.01) (M10.9)   2. Atherosclerosis of arteries of extremities (440.20) (I70.209)   3. Callus (700) (L84)   4. 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 Diabetes mellitus with neuropathy (250.60,357.2) (E11.40)   6. Diabetic neuropathy (250.60,357.2) (E11.40)   7. 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 Onychomycosis (110.1) (B35.1)   9. Pes planus, unspecified laterality (734) (M21.40)   10. Tenderness in limb (729.5) (M79.609)     Social History   · Former smoker (K56.87) (Z87.891)   · Never Drank Alcohol      The medication list was reviewed and updated today.      Allergies  1. No Known Drug Allergies        Physical Exam  Left Foot: Appearance: Normal except as noted: excessive pronation-and-pes planus. Right Foot: Appearance: Normal except as noted: excessive pronation-and-pes planus. Left Ankle: ROM: limited ROM in all planes Motor: diffuse weakness. Right Ankle: ROM: limited ROM in all planes Motor: diffuse weakness. Neurological Exam: performed. Light touch was absent bilaterally. Vibratory sensation was absent bilaterally. Deep tendon reflexes: achilles reflex One/4 bilaterally. Vascular Exam: performed Dorsalis pedis pulses were One/4 bilaterally. Posterior tibial pulses were One/4 bilaterally. Elevation Pallor: present bilaterally. Dependence rubor was absent bilaterally. Capillary refill time was Q. 9, findings bilateral. Negative digital hair noted, but-between 1-3 seconds bilaterally. Edema: mild bilaterally-and-2/7 pitting edema. Negative Homans sign, bilateral.   Toenails: All of the toenails were elongated,-hypertrophied,-discolored,-shown to have subungual debris,-tender-and-Mycotic with onychauxis.      Socks and shoes removed, Right Foot Findings: swollen and erythematous, but no dryness. Diminished tactile sensation with monofilament testing throughout the right foot.   Socks and shoes removed, Left Foot Findings: swollen, erythematous and dry.  Diminished tactile sensation with monofilament testing throughout the left foot.   Capillary refills findings on the right were delayed in the toes. Pulses:   1+ in the posterior tibialis on the right   1+ in the dorsalis pedis on the right. Capillary refills findings on the left were delayed in the toes. Pulses:   1+ in the posterior tibialis on the left   1+ in the dorsalis pedis on the left. 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 There is eczema in his heels. There are fissures. There is no cellulitis. Shoe Gear Evaluation: performed ().    Procedure        Procedure: toenail debridement performed on all toenails . Indications for the procedure include professional performance of nail care required due to loss of sensation from diabetes. The procedure's risks were discussed with the patient. Procedure Note: The toenails were debrided using heavy-duty nail nippers-and-with a nail . Post-Procedure: the patient tolerated the procedure well. Complications: there were no complications. Bilateral plantar eczema was debrided down to normal levels. All mycotic nails debrided. Patient tolerated this well without complication her pain            Patient's shoes and socks removed. Right Foot/Ankle   Right Foot Inspection     Toe Exam: swelling  Sensory   Vibration: absent  Proprioception: absent   Monofilament testing: absent  Vascular     The right DP pulse is 1+. The right PT pulse is 1+.      Left Foot/Ankle  Left Foot Inspection                 Toe Exam: swelling                   Sensory   Vibration: absent  Proprioception: absent     Vascular     The left DP pulse is 1+.  The left PT pulse is 1+.    Patient's shoes and socks removed.     Right Foot/Ankle   Right Foot Inspection        Sensory   Vibration: diminished      Vascular  Capillary refills: < 3 seconds              Left Foot/Ankle  Left Foot Inspection        Sensory   Vibration: diminished      Vascular  Capillary refills: < 3 seconds           Assign Risk Category  Deformity present  Loss of protective sensation  Weak pulses  Risk: 2

## 2023-11-28 ENCOUNTER — OFFICE VISIT (OUTPATIENT)
Age: 78
End: 2023-11-28
Payer: COMMERCIAL

## 2023-11-28 VITALS — HEIGHT: 70 IN | BODY MASS INDEX: 44.38 KG/M2 | RESPIRATION RATE: 17 BRPM | WEIGHT: 310 LBS

## 2023-11-28 DIAGNOSIS — B35.1 ONYCHOMYCOSIS: ICD-10-CM

## 2023-11-28 DIAGNOSIS — M79.671 PAIN IN BOTH FEET: ICD-10-CM

## 2023-11-28 DIAGNOSIS — E11.42 DIABETIC POLYNEUROPATHY ASSOCIATED WITH TYPE 2 DIABETES MELLITUS (HCC): Primary | ICD-10-CM

## 2023-11-28 DIAGNOSIS — I70.209 ATHEROSCLEROSIS OF ARTERIES OF EXTREMITIES (HCC): ICD-10-CM

## 2023-11-28 DIAGNOSIS — B36.9 DERMATOMYCOSIS: ICD-10-CM

## 2023-11-28 DIAGNOSIS — M79.672 PAIN IN BOTH FEET: ICD-10-CM

## 2023-11-28 PROCEDURE — 99212 OFFICE O/P EST SF 10 MIN: CPT | Performed by: PODIATRIST

## 2023-11-28 NOTE — PROGRESS NOTES
Assessment/Plan:  Pain upon ambulation. Edema. Mycosis of nail and skin. peripheral artery disease. Diabetic neuropathy     Plan. Chart reviewed. Diabetic foot exam performed. Patient educated on condition. All abnormal tissue debrided. Nails debrided. Procedures performed without pain or complication. Subjective. Patient complains of pain in his feet with ambulation. He has pain when he wears shoes. No history of trauma. Patient is diabetic. Review of Systems   Constitutional: Negative. HENT: Negative. Eyes: Negative. Respiratory: Negative. Cardiovascular: Negative. Gastrointestinal: Negative. Endocrine: Negative. Genitourinary: Negative. Musculoskeletal: Negative. Skin: Negative. Allergic/Immunologic: Negative. Neurological: Negative. Hematological: Negative. Psychiatric/Behavioral: Negative. Objective:      Physical Exam   Cardiovascular:   Pulses:       Dorsalis pedis pulses are 1+ on the right side, and 1+ on the left side. Posterior tibial pulses are 1+ on the right side, and 1+ on the left side. Discussion/Summary     Agent has increasing symptoms of neuropathy. We will add vitamin B complex. The patient was counseled regarding instructions for management,-patient and family education,-importance of compliance with treatment. Patient is able to Self-Care. The treatment plan was reviewed with the patient/guardian. The patient/guardian understands and agrees with the treatment plan      Chief Complaint  nail care      History of Present Illness  HPI: This patient a morbidly obese 60-year-old white male with diabetes mellitus, complains of pain within his feet. He has pain in the heels with walking. He has pain in in his toes with shoe wear. He has no history of trauma. He has not done any treatment area patient also is pain in his left arch. He is having a gouty attack      Active Problems  1.  Acute gouty arthritis (274.01) (M10.9)   2. Atherosclerosis of arteries of extremities (440.20) (I70.209)   3. Callus (700) (L84)   4. 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 Diabetes mellitus with neuropathy (250.60,357.2) (E11.40)   6. Diabetic neuropathy (250.60,357.2) (E11.40)   7. 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 Onychomycosis (110.1) (B35.1)   9. Pes planus, unspecified laterality (734) (M21.40)   10. Tenderness in limb (729.5) (M79.609)     Social History   · Former smoker (R95.21) (P33.588)   · Never Drank Alcohol      The medication list was reviewed and updated today. Allergies  1. No Known Drug Allergies        Physical Exam  Left Foot: Appearance: Normal except as noted: excessive pronation-and-pes planus. Right Foot: Appearance: Normal except as noted: excessive pronation-and-pes planus. Left Ankle: ROM: limited ROM in all planes Motor: diffuse weakness. Right Ankle: ROM: limited ROM in all planes Motor: diffuse weakness. Neurological Exam: performed. Light touch was absent bilaterally. Vibratory sensation was absent bilaterally. Deep tendon reflexes: achilles reflex One/4 bilaterally. Vascular Exam: performed Dorsalis pedis pulses were One/4 bilaterally. Posterior tibial pulses were One/4 bilaterally. Elevation Pallor: present bilaterally. Dependence rubor was absent bilaterally. Capillary refill time was Q. 9, findings bilateral. Negative digital hair noted, but-between 1-3 seconds bilaterally. Edema: mild bilaterally-and-2/7 pitting edema. Negative Homans sign, bilateral.   Toenails: All of the toenails were elongated,-hypertrophied,-discolored,-shown to have subungual debris,-tender-and-Mycotic with onychauxis. Socks and shoes removed, Right Foot Findings: swollen and erythematous, but no dryness. Diminished tactile sensation with monofilament testing throughout the right foot. Socks and shoes removed, Left Foot Findings: swollen, erythematous and dry.  Diminished tactile sensation with monofilament testing throughout the left foot. Capillary refills findings on the right were delayed in the toes. Pulses:   1+ in the posterior tibialis on the right   1+ in the dorsalis pedis on the right. Capillary refills findings on the left were delayed in the toes. Pulses:   1+ in the posterior tibialis on the left   1+ in the dorsalis pedis on the left. 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 There is eczema in his heels. There are fissures. There is no cellulitis. Shoe Gear Evaluation: performed (). Procedure        Procedure: toenail debridement performed on all toenails . Indications for the procedure include professional performance of nail care required due to loss of sensation from diabetes. The procedure's risks were discussed with the patient. Procedure Note: The toenails were debrided using heavy-duty nail nippers-and-with a nail . Post-Procedure: the patient tolerated the procedure well. Complications: there were no complications. Bilateral plantar eczema was debrided down to normal levels. All mycotic nails debrided. Patient tolerated this well without complication her pain            Patient's shoes and socks removed. Right Foot/Ankle   Right Foot Inspection     Toe Exam: swelling  Sensory   Vibration: absent  Proprioception: absent   Monofilament testing: absent  Vascular     The right DP pulse is 1+. The right PT pulse is 1+. Left Foot/Ankle  Left Foot Inspection                 Toe Exam: swelling                   Sensory   Vibration: absent  Proprioception: absent     Vascular     The left DP pulse is 1+. The left PT pulse is 1+. Patient's shoes and socks removed.      Right Foot/Ankle   Right Foot Inspection        Sensory   Vibration: diminished      Vascular  Capillary refills: < 3 seconds              Left Foot/Ankle  Left Foot Inspection        Sensory   Vibration: diminished      Vascular  Capillary refills: < 3 seconds           Assign Risk Category  Deformity present  Loss of protective sensation  Weak pulses  Risk: 2

## 2023-12-18 ENCOUNTER — TELEPHONE (OUTPATIENT)
Age: 78
End: 2023-12-18

## 2024-02-26 ENCOUNTER — OFFICE VISIT (OUTPATIENT)
Age: 79
End: 2024-02-26
Payer: COMMERCIAL

## 2024-02-26 VITALS — HEIGHT: 70 IN | RESPIRATION RATE: 17 BRPM | BODY MASS INDEX: 44.38 KG/M2 | WEIGHT: 310 LBS

## 2024-02-26 DIAGNOSIS — M79.672 PAIN IN BOTH FEET: ICD-10-CM

## 2024-02-26 DIAGNOSIS — M79.671 PAIN IN BOTH FEET: ICD-10-CM

## 2024-02-26 DIAGNOSIS — B36.9 DERMATOMYCOSIS: Primary | ICD-10-CM

## 2024-02-26 PROCEDURE — 11000 DBRDMT ECZ/INFECTED SKIN<10%: CPT | Performed by: PODIATRIST

## 2024-02-26 NOTE — PROGRESS NOTES
Assessment/Plan:  Pain upon ambulation.  Edema.  Mycosis of nail and skin.  peripheral artery disease.  Diabetic neuropathy     Plan.  Chart reviewed.  Diabetic foot exam performed.  Patient educated on condition.  All tissue/skin debrided without remaining monitor issue.  Procedures performed without pain or complication.         Subjective.  Patient complains of pain in his feet with ambulation.  He has pain when he wears shoes.  No history of trauma.  Patient is diabetic.          Review of Systems   Constitutional: Negative.    HENT: Negative.    Eyes: Negative.    Respiratory: Negative.    Cardiovascular: Negative.    Gastrointestinal: Negative.    Endocrine: Negative.    Genitourinary: Negative.    Musculoskeletal: Negative.    Skin: Negative.    Allergic/Immunologic: Negative.    Neurological: Negative.    Hematological: Negative.    Psychiatric/Behavioral: Negative.          Objective:      Physical Exam   Cardiovascular:   Pulses:       Dorsalis pedis pulses are 1+ on the right side, and 1+ on the left side.        Posterior tibial pulses are 1+ on the right side, and 1+ on the left side.     Discussion/Summary     Agent has increasing symptoms of neuropathy. We will add vitamin B complex.   The patient was counseled regarding instructions for management,-patient and family education,-importance of compliance with treatment.   Patient is able to Self-Care.   The treatment plan was reviewed with the patient/guardian. The patient/guardian understands and agrees with the treatment plan      Chief Complaint  nail care      History of Present Illness  HPI: This patient a morbidly obese 68-year-old white male with diabetes mellitus, complains of pain within his feet. He has pain in the heels with walking. He has pain in in his toes with shoe wear. He has no history of trauma. He has not done any treatment area patient also is pain in his left arch. He is having a gouty attack      Active Problems  1. Acute gouty  arthritis (274.01) (M10.9)   2. Atherosclerosis of arteries of extremities (440.20) (I70.209)   3. Callus (700) (L84)   4. 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 Diabetes mellitus with neuropathy (250.60,357.2) (E11.40)   6. Diabetic neuropathy (250.60,357.2) (E11.40)   7. 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 Onychomycosis (110.1) (B35.1)   9. Pes planus, unspecified laterality (734) (M21.40)   10. Tenderness in limb (729.5) (M79.609)     Social History   · Former smoker (V15.82) (Z87.891)   · Never Drank Alcohol      The medication list was reviewed and updated today.      Allergies  1. No Known Drug Allergies        Physical Exam  Left Foot: Appearance: Normal except as noted: excessive pronation-and-pes planus.   Right Foot: Appearance: Normal except as noted: excessive pronation-and-pes planus.   Left Ankle: ROM: limited ROM in all planes Motor: diffuse weakness.   Right Ankle: ROM: limited ROM in all planes Motor: diffuse weakness.   Neurological Exam: performed. Light touch was absent bilaterally. Vibratory sensation was absent bilaterally. Deep tendon reflexes: achilles reflex One/4 bilaterally.   Vascular Exam: performed Dorsalis pedis pulses were One/4 bilaterally. Posterior tibial pulses were One/4 bilaterally. Elevation Pallor: present bilaterally. Dependence rubor was absent bilaterally. Capillary refill time was Q. 9, findings bilateral. Negative digital hair noted, but-between 1-3 seconds bilaterally. Edema: mild bilaterally-and-2/7 pitting edema. Negative Homans sign, bilateral.   Toenails: All of the toenails were elongated,-hypertrophied,-discolored,-shown to have subungual debris,-tender-and-Mycotic with onychauxis.      Socks and shoes removed, Right Foot Findings: swollen and erythematous, but no dryness. Diminished tactile sensation with monofilament testing throughout the right foot.   Socks and shoes removed, Left Foot Findings: swollen, erythematous and dry. Diminished tactile sensation with  monofilament testing throughout the left foot.   Capillary refills findings on the right were delayed in the toes.   Pulses:   1+ in the posterior tibialis on the right   1+ in the dorsalis pedis on the right.   Capillary refills findings on the left were delayed in the toes.   Pulses:   1+ in the posterior tibialis on the left   1+ in the dorsalis pedis on the left.   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 There is eczema in his heels. There are fissures. There is no cellulitis.   Shoe Gear Evaluation: performed ().      Procedure        Procedure: toenail debridement performed on all toenails .   Indications for the procedure include professional performance of nail care required due to loss of sensation from diabetes.   The procedure's risks were discussed with the patient.  Procedure Note: The toenails were debrided using heavy-duty nail nippers-and-with a nail .  Post-Procedure: the patient tolerated the procedure well. Complications: there were no complications.   Bilateral plantar eczema was debrided down to normal levels. All mycotic nails debrided. Patient tolerated this well without complication her pain            Patient's shoes and socks removed.Right Foot/Ankle   Right Foot Inspection     Toe Exam: swelling  Sensory   Vibration: absent  Proprioception: absent   Monofilament testing: absent  Vascular     The right DP pulse is 1+. The right PT pulse is 1+.      Left Foot/Ankle  Left Foot Inspection                 Toe Exam: swelling                   Sensory   Vibration: absent  Proprioception: absent     Vascular     The left DP pulse is 1+. The left PT pulse is 1+.    Patient's shoes and socks removed.     Right Foot/Ankle   Right Foot Inspection        Sensory   Vibration: diminished      Vascular  Capillary refills: < 3 seconds              Left Foot/Ankle  Left Foot Inspection        Sensory   Vibration: diminished       Vascular  Capillary refills: < 3 seconds          Patient's shoes and socks removed.    Right Foot/Ankle   Right Foot Inspection      Toe Exam: right toe deformity.     Sensory   Vibration: diminished  Monofilament testing: diminished    Vascular  Capillary refills: < 3 seconds      Left Foot/Ankle  Left Foot Inspection      Toe Exam: left toe deformity.     Sensory   Vibration: diminished  Monofilament testing: diminished    Vascular  Capillary refills: < 3 seconds      Assign Risk Category  Deformity present  Loss of protective sensation  Weak pulses  Risk: 2

## 2024-05-02 ENCOUNTER — EVALUATION (OUTPATIENT)
Dept: PHYSICAL THERAPY | Facility: CLINIC | Age: 79
End: 2024-05-02
Payer: COMMERCIAL

## 2024-05-02 DIAGNOSIS — R26.2 AMBULATORY DYSFUNCTION: Primary | ICD-10-CM

## 2024-05-02 DIAGNOSIS — R53.81 PHYSICAL DECONDITIONING: ICD-10-CM

## 2024-05-02 DIAGNOSIS — R26.89 IMBALANCE: ICD-10-CM

## 2024-05-02 PROCEDURE — 97162 PT EVAL MOD COMPLEX 30 MIN: CPT | Performed by: PHYSICAL THERAPIST

## 2024-05-02 NOTE — PROGRESS NOTES
PT Evaluation     Today's date: 2024  Patient name: Kev GALVEZ Friend  : 1945  MRN: [id number]  Referring provider: Kitty Peterson  Dx:   Encounter Diagnosis     ICD-10-CM    1. Ambulatory dysfunction  R26.2       2. Physical deconditioning  R53.81       3. Imbalance  R26.89                      Assessment  Assessment details: Kev GALVEZ Friend is a 79 y.o. male who presents for therapy regarding his general deconditioning and ambulatory dysfunction.  He demonstrated limited levels of functional mobility compared to age related values seen in his 6 minute walk test, 5x STS, TUG and evidence of imbalance demod by his Dynamic Gait Index score.  He also ambulates with impaired quality of gait including wide SHARON, trendelenburg gait and forward trunk lean.  He reports goals to want to improve his mobility so he can transfer from floor to stand, negotiate stairs and ambulate community distances with increased ease while improving his balance.  He will benefit from skilled therapy to address these above impairments in a safe environment while working towards building a comprehensive HEP that he can become independent with to continually address these deficits and improve his QOL.    Impairments: abnormal gait, activity intolerance, impaired balance, impaired physical strength, lacks appropriate home exercise program, pain with function, poor posture  and poor body mechanics  Understanding of Dx/Px/POC: good   Prognosis: good    Goals  ST-4 weeks   1.  Improve functional LE strength to decrease 5xSTS time by > 2 seconds.   2.  Improve walking endurance by increasing 6MWT distance by >150ft.   3.  Pt will be I w/ basic HEP.    LT-8 weeks   1.  Improve functional strength to be able to transfer from floor to stand.   2.  Improve balance by demonstrating improved DGI score by >3 points.   3.  Improve functional mobility by improving 6 MWT by >300ft.   4.  Pt will be I with comprehensive  HEP.      Plan  Plan details: HEP development, stretching, strengthening, A/AA/PROM, joint mobilizations, posture education, STM/MI as needed to reduce muscle tension, balance and proprioceptive training, muscle reeducation, PLOC discussed and agreed upon with patient.    Patient would benefit from: PT eval and skilled physical therapy  Planned modality interventions: cryotherapy and thermotherapy: hydrocollator packs  Planned therapy interventions: manual therapy, neuromuscular re-education, self care, therapeutic activities, therapeutic exercise and home exercise program  Frequency: 2x week  Duration in weeks: 8  Treatment plan discussed with: patient        Subjective Evaluation    History of Present Illness  Mechanism of injury: Kev S Friend has a 26 year history of cancer.  He has been overweight for most of his life time but over the past 6-7 months he has been working on losing weight and has lost > 60lbs.  He states one of his goals is to be able to go up and down stairs without relying on the railings and get on/off the floor with increased ease.  He also states he would like to start walking to help with improving his mobility.  He denies any falls in past year but did recently lose his balance from his cat which caused him to bruise and cut his left forearm.      Social Support  Steps to enter house: yes  5  Stairs in house: yes   14  Lives in: multiple-level home  Lives with: alone    Employment status: not working  Hand dominance: right          Objective     Static Posture     Comments  Patient with slouched sitting posture, increased thoracic kyphosis and forward trunk lean in standing.    Ambulation     Ambulation: Stairs   Ascend stairs: independent  Pattern: reciprocal  Railings: one rail  Descend stairs: independent  Pattern: reciprocal  Railings: one rail    Observational Gait   Increased left stance time. Decreased walking speed, stride length and right stance time.   Left arm swing:  decreased  Right arm swing: increased  Base of support: increased        TUG  MDC: 4.14 sec  Cut off score:  >13.5 sec community dwelling adults  >32.2 frail elderly  <20 I for basic transfers  >30 dependent on transfers 5xSTS: Dean et al 2010  MDC: 2.3 sec  Age Norms:  60-69: 11.1 sec  70-79: 12.6 sec  80-89: 14.8 sec   6 Minute Walk Test  Age Norms  60-69: M - 1876 ft (571.80 m)  F - 1765 ft (537.98 m)  70-79: M - 1729 ft (527.00 m)  F - 1545 ft (470.92 m)  80-89: M - 1368 ft (416.97 m)  F - 1286 ft (391.97 m) DGI  MDC: vestibular - 4 pts  MDC: geriatric/community - 3 pts  Falls risk <19/24       Outcome Measures Initial Eval            5xSTS 15.45s          TUG  TUG Cog  TUG Carry 15.42s          DGI 16          6MWT 625ft                  Precautions: Non-Hodgkins Lymphoma, Diabetes, A-fib, pacemaker      Manuals   5/2/24      IE                     Neuro Re-Ed                                                Ther Ex                                                      Ther Activity                  Gait Training                  Modalities

## 2024-05-02 NOTE — LETTER
May 3, 2024    Kitty Peterson   [place] [id number]    Patient: Kev Alatorre   YOB: 1945   Date of Visit: 2024     Encounter Diagnosis     ICD-10-CM    1. Ambulatory dysfunction  R26.2       2. Physical deconditioning  R53.81       3. Imbalance  R26.89           Dear Dr. Peterson:    Thank you for your recent referral of Kev GALVEZ Friend. Please review the attached evaluation summary from Kev's recent visit.     Please verify that you agree with the plan of care by signing the attached order.     If you have any questions or concerns, please do not hesitate to call.     I sincerely appreciate the opportunity to share in the care of one of your patients and hope to have another opportunity to work with you in the near future.       Sincerely,    Erickson Hancock, PT      Referring Provider:      I certify that I have read the below Plan of Care and certify the need for these services furnished under this plan of treatment while under my care.                    Kitty Peterson   [place] [id number]  Via Fax: [phone]          PT Evaluation     Today's date: 2024  Patient name: Kev Alatorre  : 1945  MRN: [id number]  Referring provider: Kitty Peterson  Dx:   Encounter Diagnosis     ICD-10-CM    1. Ambulatory dysfunction  R26.2       2. Physical deconditioning  R53.81       3. Imbalance  R26.89                      Assessment  Assessment details: Kev GALVEZ Friend is a 79 y.o. male who presents for therapy regarding his general deconditioning and ambulatory dysfunction.  He demonstrated limited levels of functional mobility compared to age related values seen in his 6 minute walk test, 5x STS, TUG and evidence of imbalance demod by his Dynamic Gait Index score.  He also ambulates with impaired quality of gait including wide SHARON, trendelenburg gait and forward trunk lean.  He reports goals to want to improve his mobility so he can  transfer from floor to stand, negotiate stairs and ambulate community distances with increased ease while improving his balance.  He will benefit from skilled therapy to address these above impairments in a safe environment while working towards building a comprehensive HEP that he can become independent with to continually address these deficits and improve his QOL.    Impairments: abnormal gait, activity intolerance, impaired balance, impaired physical strength, lacks appropriate home exercise program, pain with function, poor posture  and poor body mechanics  Understanding of Dx/Px/POC: good   Prognosis: good    Goals  ST-4 weeks   1.  Improve functional LE strength to decrease 5xSTS time by > 2 seconds.   2.  Improve walking endurance by increasing 6MWT distance by >150ft.   3.  Pt will be I w/ basic HEP.    LT-8 weeks   1.  Improve functional strength to be able to transfer from floor to stand.   2.  Improve balance by demonstrating improved DGI score by >3 points.   3.  Improve functional mobility by improving 6 MWT by >300ft.   4.  Pt will be I with comprehensive HEP.      Plan  Plan details: HEP development, stretching, strengthening, A/AA/PROM, joint mobilizations, posture education, STM/MI as needed to reduce muscle tension, balance and proprioceptive training, muscle reeducation, PLOC discussed and agreed upon with patient.    Patient would benefit from: PT eval and skilled physical therapy  Planned modality interventions: cryotherapy and thermotherapy: hydrocollator packs  Planned therapy interventions: manual therapy, neuromuscular re-education, self care, therapeutic activities, therapeutic exercise and home exercise program  Frequency: 2x week  Duration in weeks: 8  Treatment plan discussed with: patient        Subjective Evaluation    History of Present Illness  Mechanism of injury: Kev GALVEZ Friend has a 26 year history of cancer.  He has been overweight for most of his life time but over  the past 6-7 months he has been working on losing weight and has lost > 60lbs.  He states one of his goals is to be able to go up and down stairs without relying on the railings and get on/off the floor with increased ease.  He also states he would like to start walking to help with improving his mobility.  He denies any falls in past year but did recently lose his balance from his cat which caused him to bruise and cut his left forearm.      Social Support  Steps to enter house: yes  5  Stairs in house: yes   14  Lives in: multiple-level home  Lives with: alone    Employment status: not working  Hand dominance: right          Objective     Static Posture     Comments  Patient with slouched sitting posture, increased thoracic kyphosis and forward trunk lean in standing.    Ambulation     Ambulation: Stairs   Ascend stairs: independent  Pattern: reciprocal  Railings: one rail  Descend stairs: independent  Pattern: reciprocal  Railings: one rail    Observational Gait   Increased left stance time. Decreased walking speed, stride length and right stance time.   Left arm swing: decreased  Right arm swing: increased  Base of support: increased        TUG  MDC: 4.14 sec  Cut off score:  >13.5 sec community dwelling adults  >32.2 frail elderly  <20 I for basic transfers  >30 dependent on transfers 5xSTS: Dean et al 2010  MDC: 2.3 sec  Age Norms:  60-69: 11.1 sec  70-79: 12.6 sec  80-89: 14.8 sec   6 Minute Walk Test  Age Norms  60-69: M - 1876 ft (571.80 m)  F - 1765 ft (537.98 m)  70-79: M - 1729 ft (527.00 m)  F - 1545 ft (470.92 m)  80-89: M - 1368 ft (416.97 m)  F - 1286 ft (391.97 m) DGI  MDC: vestibular - 4 pts  MDC: geriatric/community - 3 pts  Falls risk <19/24       Outcome Measures Initial Eval            5xSTS 15.45s          TUG  TUG Cog  TUG Carry 15.42s          DGI 16          6MWT 625ft                  Precautions: Non-Hodgkins Lymphoma, Diabetes, A-fib, pacemaker      Manuals   5/2/24      IE                      Neuro Re-Ed                                                Ther Ex                                                      Ther Activity                  Gait Training                  Modalities

## 2024-05-06 ENCOUNTER — OFFICE VISIT (OUTPATIENT)
Age: 79
End: 2024-05-06
Payer: COMMERCIAL

## 2024-05-06 ENCOUNTER — OFFICE VISIT (OUTPATIENT)
Dept: PHYSICAL THERAPY | Facility: CLINIC | Age: 79
End: 2024-05-06
Payer: COMMERCIAL

## 2024-05-06 VITALS
HEART RATE: 74 BPM | DIASTOLIC BLOOD PRESSURE: 69 MMHG | HEIGHT: 70 IN | RESPIRATION RATE: 17 BRPM | SYSTOLIC BLOOD PRESSURE: 133 MMHG | WEIGHT: 310 LBS | BODY MASS INDEX: 44.38 KG/M2

## 2024-05-06 DIAGNOSIS — M79.671 PAIN IN BOTH FEET: ICD-10-CM

## 2024-05-06 DIAGNOSIS — R26.2 AMBULATORY DYSFUNCTION: Primary | ICD-10-CM

## 2024-05-06 DIAGNOSIS — R26.89 IMBALANCE: ICD-10-CM

## 2024-05-06 DIAGNOSIS — R53.81 PHYSICAL DECONDITIONING: ICD-10-CM

## 2024-05-06 DIAGNOSIS — M79.672 PAIN IN BOTH FEET: ICD-10-CM

## 2024-05-06 DIAGNOSIS — B36.9 DERMATOMYCOSIS: Primary | ICD-10-CM

## 2024-05-06 PROCEDURE — 97110 THERAPEUTIC EXERCISES: CPT | Performed by: PHYSICAL THERAPIST

## 2024-05-06 PROCEDURE — 97112 NEUROMUSCULAR REEDUCATION: CPT | Performed by: PHYSICAL THERAPIST

## 2024-05-06 PROCEDURE — 11000 DBRDMT ECZ/INFECTED SKIN<10%: CPT | Performed by: PODIATRIST

## 2024-05-06 NOTE — PROGRESS NOTES
Daily Note     Today's date: 2024  Patient name: Kev GALVEZ Friend  : 1945  MRN: [id number]  Referring provider: Kitty Peterson  Dx:   Encounter Diagnosis     ICD-10-CM    1. Ambulatory dysfunction  R26.2       2. Physical deconditioning  R53.81       3. Imbalance  R26.89                      Subjective: Kev S Friend reports he already tried to start walking around his home and also outside a little more since his IE which he states went well.      Objective: See treatment diary below      Assessment: Tolerated treatment well. Patient with low level fatigue and evidence of imbalance with initiation of a basic HEP today.  He demos good understanding to be able to perform and review/progress next session.      Plan: Continue per plan of care.      Precautions: Non-Hodgkins Lymphoma, Diabetes, A-fib, pacemaker       Insurance:  [place]/CMS Eval/ Re-eval Auth #/ Referral # Total units or visits Start date  Expiration date KX? Visit limitation?  PT only or  PT+OT? Co-Insurance   CMS 24 Pend                        POC Start Date POC Expiration Date Signed POC?   26 Pend      Date              Visits/Units: Pend  Used 1 2             Authed:  Remaining                   Access Code: [email or web address]  URL: [email or web address]  Date: 2024  Prepared by: Erickson Hancock    Exercises  - Side Stepping with Counter Support  - 1 x daily - 7 x weekly - 1-2 sets - 10 reps  - Heel Raises with Counter Support  - 1 x daily - 7 x weekly - 2 sets - 10 reps  - Sit to Stand with Hands on Knees  - 1 x daily - 7 x weekly - 2 sets - 10 reps  - Seated March  - 1 x daily - 7 x weekly - 2 sets - 10 reps  - Romberg Stance with Head Rotation  - 1 x daily - 7 x weekly - 2 sets - 10 reps    Manuals  24     2 IE                     Neuro Re-Ed      Side step at table  [id number]    STS  2x10    1/2 romberg  W/head rot 2x10 +  Flex/ext 2x10                            Ther Ex      B Heel raises   2x10    Seated march  2x10    Seated add iso  2x10 5s    HEP  initiated

## 2024-05-09 ENCOUNTER — OFFICE VISIT (OUTPATIENT)
Dept: PHYSICAL THERAPY | Facility: CLINIC | Age: 79
End: 2024-05-09
Payer: COMMERCIAL

## 2024-05-09 DIAGNOSIS — R53.81 PHYSICAL DECONDITIONING: ICD-10-CM

## 2024-05-09 DIAGNOSIS — R26.2 AMBULATORY DYSFUNCTION: Primary | ICD-10-CM

## 2024-05-09 DIAGNOSIS — R26.89 IMBALANCE: ICD-10-CM

## 2024-05-09 PROCEDURE — 97110 THERAPEUTIC EXERCISES: CPT | Performed by: PHYSICAL THERAPIST

## 2024-05-09 PROCEDURE — 97112 NEUROMUSCULAR REEDUCATION: CPT | Performed by: PHYSICAL THERAPIST

## 2024-05-09 NOTE — PROGRESS NOTES
Daily Note     Today's date: 2024  Patient name: Kev GALVEZ Friend  : 1945  MRN: [id number]  Referring provider: Kitty Peterson  Dx:   Encounter Diagnosis     ICD-10-CM    1. Ambulatory dysfunction  R26.2       2. Physical deconditioning  R53.81       3. Imbalance  R26.89                      Subjective: Kev S Friend reports he did well with his HEP this weekend with feeling sore in thighs but no pain.  He was able to also go for a walk and made it a little further.        Objective: See treatment diary below      Assessment: Tolerated treatment well. Patient demonstrated fatigue post treatment and exhibited good technique with therapeutic exercises with good carry over of increased fwd hip hinge with step ups today.  He tolerated progression of repetitions and intensity of exercises well.      Plan: Continue per plan of care.      Precautions: Non-Hodgkins Lymphoma, Diabetes, A-fib, pacemaker       Insurance:  [place]/CMS Eval/ Re-eval Auth #/ Referral # Total units or visits Start date  Expiration date KX? Visit limitation?  PT only or  PT+OT? Co-Insurance   CMS 24 [id number]                     POC Start Date POC Expiration Date Signed POC?   26 Pend     [id number] Date             Visits/Units: 8 Used 1 2 3            Authed:  Remaining  7 6 5               Access Code: [email or web address]  URL: [email or web address]  Date: 2024  Prepared by: Erickson Hancock    Exercises  - Side Stepping with Counter Support  - 1 x daily - 7 x weekly - 1-2 sets - 10 reps  - Heel Raises with Counter Support  - 1 x daily - 7 x weekly - 2 sets - 10 reps  - Sit to Stand with Hands on Knees  - 1 x daily - 7 x weekly - 2 sets - 10 reps  - Seated March  - 1 x daily - 7 x weekly - 2 sets - 10 reps  - Romberg Stance with Head Rotation  - 1 x daily - 7 x weekly - 2 sets - 10 reps    Manuals 24    3 2 IE                     Neuro Re-Ed      Side step at table [id number]  "at rail +  5x20ft at rail [id number]    STS 2x10 2x10    1/2 romberg W/head rot 2x10 +  Flex/ext 2x10 W/head rot 2x10 +  Flex/ext 2x10    Fwd step up  10x B 6\" 1HR     B Heel raise off airex 2x10                 Ther Ex      B Heel raises  2x10    Seated march On/off horse seated  2x10 B 2x10    Seated add iso 2x10 5s 2x10 5s    HEP  initiated                                               "

## 2024-05-13 ENCOUNTER — APPOINTMENT (OUTPATIENT)
Dept: PHYSICAL THERAPY | Facility: CLINIC | Age: 79
End: 2024-05-13
Payer: COMMERCIAL

## 2024-05-16 ENCOUNTER — OFFICE VISIT (OUTPATIENT)
Dept: PHYSICAL THERAPY | Facility: CLINIC | Age: 79
End: 2024-05-16
Payer: COMMERCIAL

## 2024-05-16 DIAGNOSIS — R53.81 PHYSICAL DECONDITIONING: ICD-10-CM

## 2024-05-16 DIAGNOSIS — R26.2 AMBULATORY DYSFUNCTION: Primary | ICD-10-CM

## 2024-05-16 DIAGNOSIS — R26.89 IMBALANCE: ICD-10-CM

## 2024-05-16 PROCEDURE — 97112 NEUROMUSCULAR REEDUCATION: CPT | Performed by: PHYSICAL THERAPIST

## 2024-05-16 NOTE — PROGRESS NOTES
Daily Note     Today's date: 2024  Patient name: Kev GALVEZ Friend  : 1945  MRN: [id number]  Referring provider: Kitty Peterson  Dx:   Encounter Diagnosis     ICD-10-CM    1. Ambulatory dysfunction  R26.2       2. Physical deconditioning  R53.81       3. Imbalance  R26.89                      Subjective: Kev S Friend reports [place] and felt more unsteady than he would like.  Otherwise he has been doing well with his HEP and able to walk further.      Objective: See treatment diary below      Assessment: Tolerated treatment well. Kev S Friend demos improving balance reactions but need for close supervision with balance activities due to evidence of imbalance.  Patient demonstrated fatigue post treatment and would benefit from continued PT.      Plan: Continue per plan of care.      Precautions: Non-Hodgkins Lymphoma, Diabetes, A-fib, pacemaker       Insurance:  [place]/CMS Eval/ Re-eval Auth #/ Referral # Total units or visits Start date  Expiration date KX? Visit limitation?  PT only or  PT+OT? Co-Insurance   CMS 24 [id number] 8 24                     POC Start Date POC Expiration Date Signed POC?   26 Pend     [id number] Date             Visits/Units: 8 Used 1 2 3            Authed:  Remaining  7 6 5               Access Code: [email or web address]  URL: [email or web address]  Date: 2024  Prepared by: Erickson Hancock    Exercises  - Side Stepping with Counter Support  - 1 x daily - 7 x weekly - 1-2 sets - 10 reps  - Heel Raises with Counter Support  - 1 x daily - 7 x weekly - 2 sets - 10 reps  - Sit to Stand with Hands on Knees  - 1 x daily - 7 x weekly - 2 sets - 10 reps  - Seated March  - 1 x daily - 7 x weekly - 2 sets - 10 reps  - Romberg Stance with Head Rotation  - 1 x daily - 7 x weekly - 2 sets - 10 reps    Manuals 24    4 3 2 IE                        Neuro Re-Ed      "  Side step at table At rail w/ 2 airex pads along rail  09e09td B 42x64uy at rail +  5x20ft at rail 57m42xx    STS 2x10 10lbs 2x10 2x10    1/2 romberg  W/head rot 2x10 +  Flex/ext 2x10 W/head rot 2x10 +  Flex/ext 2x10    Fwd step up   10x B 6\" 1HR     B Heel raise off airex 2x10 2x10     Romberg on airex CS rot 10x +  CS flex/ext 10x +  Eyes Closed 3x30s   Close supervision for all      SLS tap cone in front of each LE 2x10 B 1 UE support on HR      Ther Ex       B Heel raises   2x10    Seated march  On/off horse seated  2x10 B 2x10    Seated add iso  2x10 5s 2x10 5s    HEP   initiated                                                       "

## 2024-05-20 ENCOUNTER — OFFICE VISIT (OUTPATIENT)
Dept: PHYSICAL THERAPY | Facility: CLINIC | Age: 79
End: 2024-05-20
Payer: COMMERCIAL

## 2024-05-20 DIAGNOSIS — R26.2 AMBULATORY DYSFUNCTION: Primary | ICD-10-CM

## 2024-05-20 DIAGNOSIS — R53.81 PHYSICAL DECONDITIONING: ICD-10-CM

## 2024-05-20 DIAGNOSIS — R26.89 IMBALANCE: ICD-10-CM

## 2024-05-20 PROCEDURE — 97110 THERAPEUTIC EXERCISES: CPT | Performed by: PHYSICAL THERAPIST

## 2024-05-20 PROCEDURE — 97112 NEUROMUSCULAR REEDUCATION: CPT | Performed by: PHYSICAL THERAPIST

## 2024-05-20 NOTE — PROGRESS NOTES
Daily Note     Today's date: 2024  Patient name: Kev GALVEZ Friend  : 1945  MRN: [id number]  Referring provider: Kitty Peterson  Dx:   Encounter Diagnosis     ICD-10-CM    1. Ambulatory dysfunction  R26.2       2. Physical deconditioning  R53.81       3. Imbalance  R26.89                      Subjective: Kev S Friend presented with reports of improving walking ability and awareness of his balance.  He states he has been changing his mindset in trying to be more active throughout the day, improving distances walking.      Objective: See treatment diary below      Assessment: Tolerated treatment well. Patient demonstrated fatigue post treatment with inability complete entire second set of STS with addition 5lbs today.  He also demod difficulty with retrowalking over hurdles.      Plan: Continue per plan of care.      Precautions: Non-Hodgkins Lymphoma, Diabetes, A-fib, pacemaker       Insurance:  [place]/CMS Eval/ Re-eval Auth #/ Referral # Total units or visits Start date  Expiration date KX? Visit limitation?  PT only or  PT+OT? Co-Insurance   CMS 24 [id number] 24                     POC Start Date POC Expiration Date Signed POC?   26 Pend     [id number] Date           Visits/Units: 8 Used 1 2 3 4 5          Authed:  Remaining  7 6 5 4 3             Access Code: [email or web address]  URL: [email or web address]  Date: 2024  Prepared by: Erickson Hancock    Exercises  - Side Stepping with Counter Support  - 1 x daily - 7 x weekly - 1-2 sets - 10 reps  - Heel Raises with Counter Support  - 1 x daily - 7 x weekly - 2 sets - 10 reps  - Sit to Stand with Hands on Knees  - 1 x daily - 7 x weekly - 2 sets - 10 reps  - Seated March  - 1 x daily - 7 x weekly - 2 sets - 10 reps  - Romberg Stance with Head Rotation  - 1 x daily - 7 x weekly - 2 sets - 10 reps    Manuals 24    5 4 3 2 IE                           Neuro Re-Ed        Side  "step at table Over 2 hurdles - lateral  17m20mp B At rail w/ 2 airex pads along rail  86a50rl B 45a92uv at rail +  5x20ft at rail 07p64jr    STS 10x/8x 15lbs 2x10 10lbs 2x10 2x10    Fwd/retro step at table Over 2 hurdles  5x B       1/2 romberg   W/head rot 2x10 +  Flex/ext 2x10 W/head rot 2x10 +  Flex/ext 2x10    Fwd step up    10x B 6\" 1HR     B Heel raise off airex  2x10 2x10     Romberg on airex  CS rot 10x +  CS flex/ext 10x +  Eyes Closed 3x30s   Close supervision for all      SLS tap cone in front of each LE  2x10 B 1 UE support on HR      Ther Ex        B Heel raises    2x10    Seated march   On/off horse seated  2x10 B 2x10    Seated add iso   2x10 5s 2x10 5s    HEP    initiated    education Reviewing lifestyle changes aimed to improve mobility                                                          "

## 2024-05-23 ENCOUNTER — OFFICE VISIT (OUTPATIENT)
Dept: PHYSICAL THERAPY | Facility: CLINIC | Age: 79
End: 2024-05-23
Payer: COMMERCIAL

## 2024-05-23 DIAGNOSIS — R53.81 PHYSICAL DECONDITIONING: ICD-10-CM

## 2024-05-23 DIAGNOSIS — R26.2 AMBULATORY DYSFUNCTION: Primary | ICD-10-CM

## 2024-05-23 DIAGNOSIS — R26.89 IMBALANCE: ICD-10-CM

## 2024-05-23 PROCEDURE — 97112 NEUROMUSCULAR REEDUCATION: CPT | Performed by: PHYSICAL THERAPIST

## 2024-05-23 NOTE — PROGRESS NOTES
Daily Note     Today's date: 2024  Patient name: Kev GALVEZ Friend  : 1945  MRN: [id number]  Referring provider: Kitty Peterson  Dx:   Encounter Diagnosis     ICD-10-CM    1. Ambulatory dysfunction  R26.2       2. Physical deconditioning  R53.81       3. Imbalance  R26.89                      Subjective: Kev GALVEZ Friend states he was able to walk in the grass to a softball game without his cane this week.  He is steadily seeing small gains in his mobility.      Objective: See treatment diary below      Assessment: Tolerated treatment well. Patient demonstrated fatigue post treatment, exhibited good technique with therapeutic exercises, and would benefit from continued PT to work towards DC to I HEP.      Plan: Continue per plan of care.      Precautions: Non-Hodgkins Lymphoma, Diabetes, A-fib, pacemaker       Insurance:  [place]/CMS Eval/ Re-eval Auth #/ Referral # Total units or visits Start date  Expiration date KX? Visit limitation?  PT only or  PT+OT? Co-Insurance   CMS 24 [id number]  24                     POC Start Date POC Expiration Date Signed POC?   26 Pend     [id number] Date          Visits/Units: 8 Used 1 2 3 4 5 6         Authed:  Remaining  7 6 5 4 3 2            Access Code: [email or web address]  URL: [email or web address]  Date: 2024  Prepared by: Erickson Hancock    Exercises  - Side Stepping with Counter Support  - 1 x daily - 7 x weekly - 1-2 sets - 10 reps  - Heel Raises with Counter Support  - 1 x daily - 7 x weekly - 2 sets - 10 reps  - Sit to Stand with Hands on Knees  - 1 x daily - 7 x weekly - 2 sets - 10 reps  - Seated March  - 1 x daily - 7 x weekly - 2 sets - 10 reps  - Romberg Stance with Head Rotation  - 1 x daily - 7 x weekly - 2 sets - 10 reps    Manuals 6    6 5 4 3 2                           Neuro Re-Ed        Side step on airex beam 43w30zc at beam       Side step at table At rail w/ 2 airex  "pads along rail  91t35hu B Over 2 hurdles - lateral  85g68ey B At rail w/ 2 airex pads along rail  64b30se B 10a88jd at rail +  5x20ft at rail 44z73it   STS 2x10 15lbs  10x/8x 15lbs 2x10 10lbs 2x10 2x10   Fwd/retro step at table Ar rail on/off 2 airex pads  21p07wa B Over 2 hurdles  5x B      1/2 romberg    W/head rot 2x10 +  Flex/ext 2x10 W/head rot 2x10 +  Flex/ext 2x10   Fwd step up     10x B 6\" 1HR    B Heel raise off airex   2x10 2x10    Romberg on airex   CS rot 10x +  CS flex/ext 10x +  Eyes Closed 3x30s   Close supervision for all     SLS tap cone in front of each LE   2x10 B 1 UE support on HR     Ther Ex        B Heel raises     2x10   Seated march    On/off horse seated  2x10 B 2x10   Seated add iso    2x10 5s 2x10 5s   HEP     initiated   education  Reviewing lifestyle changes aimed to improve mobility                                                           "

## 2024-05-29 ENCOUNTER — OFFICE VISIT (OUTPATIENT)
Dept: PHYSICAL THERAPY | Facility: CLINIC | Age: 79
End: 2024-05-29
Payer: COMMERCIAL

## 2024-05-29 DIAGNOSIS — R26.2 AMBULATORY DYSFUNCTION: Primary | ICD-10-CM

## 2024-05-29 DIAGNOSIS — R26.89 IMBALANCE: ICD-10-CM

## 2024-05-29 DIAGNOSIS — R53.81 PHYSICAL DECONDITIONING: ICD-10-CM

## 2024-05-29 PROCEDURE — 97110 THERAPEUTIC EXERCISES: CPT | Performed by: PHYSICAL THERAPIST

## 2024-05-29 PROCEDURE — 97112 NEUROMUSCULAR REEDUCATION: CPT | Performed by: PHYSICAL THERAPIST

## 2024-05-29 NOTE — PROGRESS NOTES
Daily Note     Today's date: 2024  Patient name: Kev GALVEZ Friend  : 1945  MRN: [id number]  Referring provider: Kitty Peterson  Dx:   Encounter Diagnosis     ICD-10-CM    1. Ambulatory dysfunction  R26.2       2. Physical deconditioning  R53.81       3. Imbalance  R26.89                      Subjective: Kev GALVEZ Friend reports he is very sore today all over as he was out all day doing a lot of extra walking compared to normal.      Objective: See treatment diary below      Assessment: Tolerated treatment well. Patient with evidence of imbalance with turning in place.  He demod good understanding of proper use of rec bike to implement when he starts at the gym      Plan: Continue per plan of care.      Precautions: Non-Hodgkins Lymphoma, Diabetes, A-fib, pacemaker       Insurance:  [place]/CMS Eval/ Re-eval Auth #/ Referral # Total units or visits Start date  Expiration date KX? Visit limitation?  PT only or  PT+OT? Co-Insurance   CMS 24 [id number]  24                     POC Start Date POC Expiration Date Signed POC?   26 Pend     [id number] Date         Visits/Units: 8 Used 1 2 3 4 5 6 7        Authed:  Remaining  7 6 5 4 3 2 1           Access Code: [email or web address]  URL: [email or web address]  Date: 2024  Prepared by: Erickson Hancock    Exercises  - Side Stepping with Counter Support  - 1 x daily - 7 x weekly - 1-2 sets - 10 reps  - Heel Raises with Counter Support  - 1 x daily - 7 x weekly - 2 sets - 10 reps  - Sit to Stand with Hands on Knees  - 1 x daily - 7 x weekly - 2 sets - 10 reps  - Seated March  - 1 x daily - 7 x weekly - 2 sets - 10 reps  - Romberg Stance with Head Rotation  - 1 x daily - 7 x weekly - 2 sets - 10 reps    Manuals     7 6 5 4 3 2                              Neuro Re-Ed         Turn in place  180 degrees at rail 5x +  360 degrees 3x B        Side step on airex beam  70i89ib at beam   "     Side step at table  At rail w/ 2 airex pads along rail  93j09tf B Over 2 hurdles - lateral  74f63rc B At rail w/ 2 airex pads along rail  43o74rk B 63o21xv at rail +  5x20ft at rail 47j33mr   STS 5x 2x10 15lbs  10x/8x 15lbs 2x10 10lbs 2x10 2x10   Fwd/retro step at table  Ar rail on/off 2 airex pads  96a67mv B Over 2 hurdles  5x B      1/2 romberg     W/head rot 2x10 +  Flex/ext 2x10 W/head rot 2x10 +  Flex/ext 2x10   Fwd step up      10x B 6\" 1HR    B Heel raise off airex    2x10 2x10    Romberg on airex    CS rot 10x +  CS flex/ext 10x +  Eyes Closed 3x30s   Close supervision for all     SLS tap cone in front of each LE    2x10 B 1 UE support on HR     Ther Ex         B Heel raises 2x10 heel and toes     2x10   Seated march 2x10 march    On/off horse seated  2x10 B 2x10   Seated add iso 2x10 5s    2x10 5s 2x10 5s   HEP      initiated   education   Reviewing lifestyle changes aimed to improve mobility      Rec Bike L0 5 min and educated on fit and cardiac guidelines.                                                           "

## 2024-05-31 ENCOUNTER — OFFICE VISIT (OUTPATIENT)
Dept: PHYSICAL THERAPY | Facility: CLINIC | Age: 79
End: 2024-05-31
Payer: COMMERCIAL

## 2024-05-31 DIAGNOSIS — R26.2 AMBULATORY DYSFUNCTION: Primary | ICD-10-CM

## 2024-05-31 DIAGNOSIS — R53.81 PHYSICAL DECONDITIONING: ICD-10-CM

## 2024-05-31 DIAGNOSIS — R26.89 IMBALANCE: ICD-10-CM

## 2024-05-31 PROCEDURE — 97110 THERAPEUTIC EXERCISES: CPT | Performed by: PHYSICAL THERAPIST

## 2024-05-31 NOTE — PROGRESS NOTES
Daily Note     Today's date: 2024  Patient name: Kev S Friend  : 1945  MRN: [id number]  Referring provider: Kitty Peterson  Dx:   Encounter Diagnosis     ICD-10-CM    1. Ambulatory dysfunction  R26.2       2. Physical deconditioning  R53.81       3. Imbalance  R26.89                      Subjective: Kev S Friend reports he has recovered from soreness earlier in week.  He has been steadily doing more outside and walking more and feels he is making progress.      Objective: See treatment diary below    TUG  MDC: 4.14 sec  Cut off score:  >13.5 sec community dwelling adults  >32.2 frail elderly  <20 I for basic transfers  >30 dependent on transfers 5xSTS: Dean et al 2010  MDC: 2.3 sec  Age Norms:  60-69: 11.1 sec  70-79: 12.6 sec  80-89: 14.8 sec   6 Minute Walk Test  Age Norms  60-69: M - 1876 ft (571.80 m)  F - 1765 ft (537.98 m)  70-79: M - 1729 ft (527.00 m)  F - 1545 ft (470.92 m)  80-89: M - 1368 ft (416.97 m)  F - 1286 ft (391.97 m) DGI  MDC: vestibular - 4 pts  MDC: geriatric/community - 3 pts  Falls risk <       Outcome Measures Initial Eval   24         5xSTS 15.45s 13.84s         TUG  TUG Cog  TUG Carry 15.42s 15.52         DGI 16          6MWT 625ft 275ft stopped at 2:30 due to SOB.             Assessment: Pt has made good progress at therapy in past month with reports of improved ability to walk community distances, feeling stronger and able to negotiate his environment with increase ease.  He demos full independence with a HEP aimed at global LE strength, balance and endurance.  With continued compliance of HEP and lifestyle modifications he should continue to see gains in his strength and functional mobility.    Goals  ST-4 weeks   1.  Improve functional LE strength to decrease 5xSTS time by > 2 seconds. - Met   2.  Improve walking endurance by increasing 6MWT distance by >150ft. - Not Met   3.  Pt will be I w/ basic HEP. - Met    LT-8 weeks   1.  Improve  functional strength to be able to transfer from floor to stand. - Not met   2.  Improve balance by demonstrating improved DGI score by >3 points. - Not met   3.  Improve functional mobility by improving 6 MWT by >300ft. - Not Met   4.  Pt will be I with comprehensive HEP. - Met    Plan: DC to I HEP at this time.  He is to contact PT with any changes in status or if he feels he needs a re-evaluation/adjustment of HEP.     Precautions: Non-Hodgkins Lymphoma, Diabetes, A-fib, pacemaker       Insurance:  [place]/CMS Eval/ Re-eval Auth #/ Referral # Total units or visits Start date  Expiration date KX? Visit limitation?  PT only or  PT+OT? Co-Insurance   CMS 5/2/24 [id number] 5/2/24 5/30/24                     POC Start Date POC Expiration Date Signed POC?   5/2/26 6/27/24 Pend     [id number] Date 5/2 5/6 5/9 5/16 5/20 5/23 5/29 5/31       Visits/Units: 8 Used 1 2 3 4 5 6 7 8       Authed:  Remaining  7 6 5 4 3 2 1 0          Access Code: [email or web address]  URL: [email or web address]  Date: 05/31/2024  Prepared by: Erickson Hancock    Exercises  - Side Stepping with Counter Support  - 1 x daily - 7 x weekly - 1-2 sets - 10 reps  - Heel Raises with Counter Support  - 1 x daily - 7 x weekly - 2 sets - 10 reps  - Sit to Stand with Hands on Knees  - 1 x daily - 7 x weekly - 2 sets - 10 reps  - Seated March  - 1 x daily - 7 x weekly - 2 sets - 10 reps  - Romberg Stance with Head Rotation  - 1 x daily - 7 x weekly - 2 sets - 10 reps  - Backward Walking with Counter Support  - 1 x daily - 7 x weekly - 1 sets - 10 reps  - Step Up  - 1 x daily - 7 x weekly - 2 sets - 10 reps    Manuals 5/31 5/29 5/23 5/20 5/16    8 7 6 5 4                           Neuro Re-Ed        Turn in place   180 degrees at rail 5x +  360 degrees 3x B      Side step on airex beam   09g70jy at beam     Side step at table   At rail w/ 2 airex pads along rail  27s75ry B Over 2 hurdles - lateral  40z33pa B At rail w/ 2 airex pads along rail  33v88qb B   STS  5x  2x10 15lbs  10x/8x 15lbs 2x10 10lbs   Fwd/retro step at table   Ar rail on/off 2 airex pads  15q21up B Over 2 hurdles  5x B    1/2 romberg        Fwd step up         B Heel raise off airex     2x10   Romberg on airex     CS rot 10x +  CS flex/ext 10x +  Eyes Closed 3x30s   Close supervision for all   SLS tap cone in front of each LE     2x10 B 1 UE support on HR   Ther Ex        B Heel raises  2x10 heel and toes      Seated march  2x10 march      Seated add iso  2x10 5s      HEP Updated and reviewed       education POC and lifestyle education   Reviewing lifestyle changes aimed to improve mobility    Rec Bike  L0 5 min and educated on fit and cardiac guidelines.      6MWT performed       TUG performed